# Patient Record
Sex: MALE | Race: WHITE
[De-identification: names, ages, dates, MRNs, and addresses within clinical notes are randomized per-mention and may not be internally consistent; named-entity substitution may affect disease eponyms.]

---

## 2018-03-30 ENCOUNTER — HOSPITAL ENCOUNTER (INPATIENT)
Dept: HOSPITAL 77 - KA.MS | Age: 83
LOS: 2 days | Discharge: HOME | DRG: 194 | End: 2018-04-01
Attending: NURSE PRACTITIONER | Admitting: NURSE PRACTITIONER
Payer: MEDICARE

## 2018-03-30 DIAGNOSIS — G30.9: ICD-10-CM

## 2018-03-30 DIAGNOSIS — B95.5: ICD-10-CM

## 2018-03-30 DIAGNOSIS — K44.9: ICD-10-CM

## 2018-03-30 DIAGNOSIS — M10.9: ICD-10-CM

## 2018-03-30 DIAGNOSIS — J18.9: Primary | ICD-10-CM

## 2018-03-30 DIAGNOSIS — N18.9: ICD-10-CM

## 2018-03-30 DIAGNOSIS — I50.9: ICD-10-CM

## 2018-03-30 DIAGNOSIS — F02.80: ICD-10-CM

## 2018-03-30 DIAGNOSIS — I13.0: ICD-10-CM

## 2018-03-30 DIAGNOSIS — G25.81: ICD-10-CM

## 2018-03-30 DIAGNOSIS — I48.2: ICD-10-CM

## 2018-03-30 PROCEDURE — C9113 INJ PANTOPRAZOLE SODIUM, VIA: HCPCS

## 2018-03-30 RX ADMIN — FLUTICASONE PROPIONATE SCH SPRAY: 50 SPRAY, METERED NASAL at 21:04

## 2018-03-31 LAB
CHLORIDE SERPL-SCNC: 107 MMOL/L (ref 98–115)
SODIUM SERPL-SCNC: 144 MMOL/L (ref 136–145)

## 2018-03-31 RX ADMIN — FLUTICASONE PROPIONATE SCH SPRAY: 50 SPRAY, METERED NASAL at 08:57

## 2018-03-31 RX ADMIN — Medication PRN ML: at 21:04

## 2018-03-31 RX ADMIN — FLUTICASONE PROPIONATE SCH SPRAY: 50 SPRAY, METERED NASAL at 21:04

## 2018-03-31 NOTE — PN
03/31/2018 PATIENT NAME:  SREEDHAR MCDOWELL

 

CHIEF COMPLAINT:  Does feel overall better.  White count is decreasing.  No

sputum production.

 

BRIEF HISTORY:  This patient was admitted by Randa Rondon, Nurse Practitioner,

yesterday due to bilateral lower lobe pneumonia.  The patient failed outpatient

treatment.  He was seen and evaluated yesterday.  Complains of "feeling

miserable".  He had woke up early in the morning around 2:30 with body aches,

chills, headaches, and shortness of breath.  The patient does report pneumonia

approximately four or five years ago.  The patient also reports history of

pleural effusions with status post thoracentesis at one time.  He was admitted

for IV antibiotics for pneumonia.

 

PHYSICAL EXAM:  VITAL SIGNS:  Blood pressure 134/87, heart rate 80, irregular,

temperature 97.8, respiratory rate 18, O2 sats 95%, this is room air.  The

patient's weight is 194.5 pounds.

GENERAL:  The patient is alert and oriented. LUNGS:  He does have some mild

rhonchi in lower bases, however diaphragmatic excursion normal.

CV:  Irregular rhythm, however, rate controlled at 80.  No S3.  PMI

midclavicular line, 2nd or 3rd intercostal space.

GI/:  Nontender.  Good bowel tones.

EXTREMITIES:  Lower extremity slightly erythematous; 2 to 3+ edema of the right

great toe.  Good distal pulses.

 

LABS:  Yesterday, white count 25,000; this morning, 11.6, neutrophilia 80%.  INR

2.4.  Sodium and potassium normal.  Creatinine 1.48, BUN 22, calcium 8.9,

hemoglobin 16.2, hematocrit 49.1.  BNP in [facility] 170, slightly elevated.

 

IMPRESSION/PLAN:

1. Bilateral lower lobe pneumonia, likely streptococcal.  Discontinue

    levofloxacin.  Add azithromycin.  Continue with ceftriaxone.  We will see

    if we can get sputum culture, assess pneumococcal status.  Incentive

    spirometer.  Good pulmonary toileting.  DuoNebs.

2. Inflammatory gout.  We will add tapered dose of prednisone likely to

    benefit for pneumonia, however, was given loop diuretic yesterday.

3. Atrial fibrillation, chronic.  CHADVAS2 score high on anticoagulation.  INR

    therapeutic.  Rate control with beta-blocker.

4. Heart failure, minimally clinical presentation at this time.  BNP slightly

    elevated at 170, although does have gouty arthropathy, likely could benefit

    from 20 mg of Lasix today.  Chest x-ray, independently read, does have some

    vascular congestion, slightly fluid overload picture.  Daily weights.  ARB

    ongoing.  Mean arterial pressure adequate.  The patient is warm, pink, and

    perfusing.  Adequate urinary output.

5. Chronic kidney disease.  BUN 22, creatinine 1.48, adequate output, likely

    not a candidate for NSAIDs for gout.  We will give low-dose tapering of

    prednisone.  Eventually, likely will benefit from renal dose allopurinol at

    some point after acute flare up.

6. GI stress prophylaxis.  PPI therapy (GERD history).

Other chronic medical conditions include

1. Hypertension.

2. Hiatal hernia.

3. RLS.

All stable.

 

DISPOSITION/OVERALL PLAN:  The patient likely could benefit from another 24-to-

48 hours inpatient stay.  Discontinue levofloxacin.  Add azithromycin.  Assess

labs in the a.m.  Incentive spirometer, pulmonary toileting, prednisone for

gout, PPI therapy.

 

DD:  03/31/2018 11:04:41

DT:  03/31/2018 13:04:33

Job #:  [id number]

## 2018-04-01 VITALS — SYSTOLIC BLOOD PRESSURE: 116 MMHG | DIASTOLIC BLOOD PRESSURE: 85 MMHG

## 2018-04-01 LAB
CHLORIDE SERPL-SCNC: 106 MMOL/L (ref 98–115)
SODIUM SERPL-SCNC: 144 MMOL/L (ref 136–145)

## 2018-04-01 RX ADMIN — Medication PRN ML: at 13:24

## 2018-04-01 RX ADMIN — Medication PRN ML: at 10:56

## 2018-04-01 RX ADMIN — FLUTICASONE PROPIONATE SCH SPRAY: 50 SPRAY, METERED NASAL at 08:07

## 2018-04-01 RX ADMIN — Medication PRN ML: at 08:09

## 2018-04-03 NOTE — DISCH
FINAL DIAGNOSES:

1. Bilateral lower lobe pneumonia, clinically improving.

2. Inflammatory gout, improving.

3. Atrial fibrillation, chronic.  CHADS2-VASc score high, on anticoagulation.

    INR therapeutic, rate controlled with beta blocker.

4. Heart failure with minimal clinical presentation.

5. Chronic kidney disease.

6. Rule out Alzheimer's.

 

HISTORY:  This patient was initially admitted to the [facility].  He was

seen previous day in the [facility].  The patient failed outpatient

treatment, was treated for pneumonia with azithromycin.  The patient was

complaining of feeling quite miserable.  He had woke up early morning with some

body aches, headache, shortness of breath.  He does report a history of

pneumonia 4 or 5 years ago, at that time status post thoracentesis.  He was

admitted mainly for outpatient treatment failure for IV antibiotics and ongoing

monitoring.

 

HOSPITAL COURSE:  Hospital course went well.  He did have a significant white

count prior to admission at 25,000 with high neutrophilia, however, with ongoing

antibiotics, it did respond to 11.6, neutrophilia 80%.  Upon admission, he was

given Levaquin and ceftriaxone.  I discontinued Levaquin and added

azithromycin.  The patient responded well.  He did have inflammatory gout in his

right great toe.  Had an elevated uric acid level on admission.  NSAIDs were

held.  He was treated over a tapering dose of steroids.  He responded favorably.

He did remain in atrial fibrillation without any hemodynamic compromise.  His

white count did elevate slightly with prednisone  and with discharge at 14.3

thousand, neutrophilia was 90%.  However, clinically he responded well to

antibiotics.  INR on discharge was 2.1.  Sodium and potassium normal.  BUN 25

and creatinine 1.34, decreasing.

 

MICROBIOLOGY REPORT:  Pending blood cultures on discharge.

 

PHYSICAL EXAMINATION:

VITAL SIGNS:  On discharge, blood pressure 116/85, O2 saturation 95% on room

air, respiratory rate 18.

GENERAL:  On discharge, the patient was alert and oriented, however, some memory

deficit apparent, however slightly.

LUNGS:  Clear to auscultation.

CV:  Irregular rate.  History of atrial fibrillation.  He was using his

incentive spirometer up to 2300 mL.  Good pulmonary excursion.  No sputum

collection was able to be obtained.

 

MEDICATION ADJUSTMENTS ON DISCHARGE:  Short course of tapered prednisone for

gout.  He can continue with azithromycin on discharge.  Discontinue Levaquin and

ceftriaxone.  He can continue on all his other home meds.

 

He is to continue with coughing and deep breathing exercises.  Report fevers or

any shortness of breath or mucus production.  He will follow up with Randa Rondon at [facility] outpatient.

 

RECOMMENDATIONS AT FOLLOWUP:  Consider neuropsychological testing for possible

Alzheimer's dementia.

 

DD:  04/03/2018 10:51:27

DT:  04/03/2018 13:10:06

Job #:  [id number]

MTDD

## 2018-04-11 ENCOUNTER — HOSPITAL ENCOUNTER (INPATIENT)
Dept: HOSPITAL 77 - KA.ED | Age: 83
LOS: 4 days | Discharge: HOME | DRG: 871 | End: 2018-04-15
Attending: FAMILY MEDICINE | Admitting: PHYSICIAN ASSISTANT
Payer: MEDICARE

## 2018-04-11 DIAGNOSIS — E46: ICD-10-CM

## 2018-04-11 DIAGNOSIS — R79.1: ICD-10-CM

## 2018-04-11 DIAGNOSIS — Z79.01: ICD-10-CM

## 2018-04-11 DIAGNOSIS — I48.91: ICD-10-CM

## 2018-04-11 DIAGNOSIS — E03.9: ICD-10-CM

## 2018-04-11 DIAGNOSIS — A41.9: Primary | ICD-10-CM

## 2018-04-11 DIAGNOSIS — I50.9: ICD-10-CM

## 2018-04-11 DIAGNOSIS — E53.8: ICD-10-CM

## 2018-04-11 DIAGNOSIS — Z79.899: ICD-10-CM

## 2018-04-11 DIAGNOSIS — R74.0: ICD-10-CM

## 2018-04-11 DIAGNOSIS — J96.01: ICD-10-CM

## 2018-04-11 DIAGNOSIS — Z88.8: ICD-10-CM

## 2018-04-11 DIAGNOSIS — N18.3: ICD-10-CM

## 2018-04-11 DIAGNOSIS — J18.1: ICD-10-CM

## 2018-04-11 DIAGNOSIS — I10: ICD-10-CM

## 2018-04-11 DIAGNOSIS — G47.00: ICD-10-CM

## 2018-04-11 DIAGNOSIS — E78.5: ICD-10-CM

## 2018-04-11 DIAGNOSIS — I95.9: ICD-10-CM

## 2018-04-11 DIAGNOSIS — E87.0: ICD-10-CM

## 2018-04-11 DIAGNOSIS — M10.9: ICD-10-CM

## 2018-04-11 DIAGNOSIS — E87.2: ICD-10-CM

## 2018-04-11 LAB
CHLORIDE SERPL-SCNC: 108 MMOL/L (ref 98–115)
SODIUM SERPL-SCNC: 145 MMOL/L (ref 136–145)

## 2018-04-11 PROCEDURE — 83880 ASSAY OF NATRIURETIC PEPTIDE: CPT

## 2018-04-11 PROCEDURE — 80053 COMPREHEN METABOLIC PANEL: CPT

## 2018-04-11 PROCEDURE — 84443 ASSAY THYROID STIM HORMONE: CPT

## 2018-04-11 PROCEDURE — 84484 ASSAY OF TROPONIN QUANT: CPT

## 2018-04-11 PROCEDURE — 87040 BLOOD CULTURE FOR BACTERIA: CPT

## 2018-04-11 PROCEDURE — 99285 EMERGENCY DEPT VISIT HI MDM: CPT

## 2018-04-11 PROCEDURE — 85610 PROTHROMBIN TIME: CPT

## 2018-04-11 PROCEDURE — 36415 COLL VENOUS BLD VENIPUNCTURE: CPT

## 2018-04-11 PROCEDURE — 85730 THROMBOPLASTIN TIME PARTIAL: CPT

## 2018-04-11 PROCEDURE — 84439 ASSAY OF FREE THYROXINE: CPT

## 2018-04-11 PROCEDURE — 81001 URINALYSIS AUTO W/SCOPE: CPT

## 2018-04-11 PROCEDURE — 86140 C-REACTIVE PROTEIN: CPT

## 2018-04-11 PROCEDURE — 96374 THER/PROPH/DIAG INJ IV PUSH: CPT

## 2018-04-11 PROCEDURE — 83735 ASSAY OF MAGNESIUM: CPT

## 2018-04-11 PROCEDURE — 85025 COMPLETE CBC W/AUTO DIFF WBC: CPT

## 2018-04-11 RX ADMIN — TAZOBACTAM SODIUM AND PIPERACILLIN SODIUM SCH MLS/HR: 375; 3 INJECTION, SOLUTION INTRAVENOUS at 19:50

## 2018-04-11 NOTE — EDM.PDOC
ED HPI GENERAL MEDICAL PROBLEM





- General


Chief Complaint: Cardiovascular Problem


Stated Complaint: SHORTNESS OF BREATH


Time Seen by Provider: 04/11/18 11:18


Source of Information: Reports: Patient


History Limitations: Reports: No Limitations





- History of Present Illness


INITIAL COMMENTS - FREE TEXT/NARRATIVE: 





Patient is an 82-year-old gentleman who presents to the emergency department 

via EMS secondary to atrial fibrillation and shortness of breath.  Patient was 

seen at [facility] in [place] for follow-up.  Status post pneumonia 

admission and was found to continue being short of breath.  Discussed case with 

Mylene from [facility], and she initiated transfer to ER.  Patient states 

that he was feeling well 2 days ago, but yesterday developed some shortness of 

breath and feeling of increased heart rate.  Symptoms worsened about 4 o'clock 

this morning, so he presented to [facility] today.  Patient denies chest 

pain, fever, nausea, vomiting, abdominal pain, peripheral edema, headache, 

vision changes, or any change in medication.


Onset: Gradual


Duration: Day(s):


Improves with: Reports: None


Worsens with: Reports: None


Associated Symptoms: Reports: Shortness of Breath





- Related Data


 Allergies











Allergy/AdvReac Type Severity Reaction Status Date / Time


 


ACE Inhibitors Allergy  THROAT Verified 04/11/18 11:32





   TICKLES  


 


lisinopril Allergy  UNKNOWN Verified 04/11/18 11:32











Home Meds: 


 Home Meds





Acetaminophen [Acetaminophen Extra Strength] 1,000 mg PO ASDIRECTED PRN 11/26/ 14 [History]


Albuterol Sulfate [Albuterol Sulfate HFA] 1 puff INH Q4H PRN 11/26/14 [History]


ClonazePAM [KlonoPIN] 0.5 mg PO BEDTIME 11/26/14 [History]


Levothyroxine [Synthroid] 50 mcg PO BEDTIME 11/26/14 [History]


Losartan [Cozaar] 25 mg PO DAILY 11/26/14 [History]


Warfarin [Coumadin] 5 mg PO SUTUWETHSA@2100 11/26/14 [History]


Cholecalciferol (Vitamin D3) [Vitamin D3] 1,000 unit PO Q48H 03/30/18 [History]


Ipratropium [Atrovent 0.03% Nasal Spray] 2 spray NASBOTH TID 03/30/18 [History]


Mecobalamin [B-12] 1,000 mcg SL Q48H 03/30/18 [History]


Metoprolol Succinate 50 mg PO DAILY 03/30/18 [History]


Warfarin [Coumadin] 2.5 mg PO MOFR@2100 03/30/18 [History]











Past Medical History


HEENT History: Reports: Cataract


Cardiovascular History: Reports: Heart Failure, Hypertension


Respiratory History: Reports: Pneumonia, Recurrent, Sleep Apnea, SOB


Gastrointestinal History: Reports: Chronic Diarrhea


Other Gastrointestinal History: gluten free d/t IBS


Musculoskeletal History: Reports: Arthritis


Neurological History: Reports: Headaches, Chronic, Head Trauma


Other Neuro History: blood clots in the brain,


Endocrine/Metabolic History: Reports: Hypothyroidism


Hematologic History: Reports: B12 Deficiency


Other Dermatologic History: shingles in sept.





- Infectious Disease History


Infectious Disease History: Reports: Chicken Pox, Influenza, Mumps, Shingles





- Past Surgical History


HEENT Surgical History: Reports: Cataract Surgery


Cardiovascular Surgical History: Reports: None


Respiratory Surgical History: Reports: Thoracentesis


Endocrine Surgical History: Reports: Thyroidectomy


Musculoskeletal Surgical History: Reports: Shoulder Surgery





Social & Family History





- Tobacco Use


Smoking Status *Q: Never Smoker


Second Hand Smoke Exposure: No





- Caffeine Use


Caffeine Use: Reports: Soda





- Recreational Drug Use


Recreational Drug Use: No





- Living Situation & Occupation


Living situation: Reports: 


Occupation: Retired





ED ROS GENERAL





- Review of Systems


Review Of Systems: ROS reveals no pertinent complaints other than HPI.


Constitutional: Reports: No Symptoms


HEENT: Reports: No Symptoms


Respiratory: Reports: Shortness of Breath


Cardiovascular: Reports: Palpitations


Endocrine: Reports: No Symptoms


GI/Abdominal: Reports: No Symptoms


: Reports: No Symptoms


Musculoskeletal: Reports: No Symptoms


Skin: Reports: No Symptoms


Neurological: Reports: No Symptoms


Psychiatric: Reports: No Symptoms


Hematologic/Lymphatic: Reports: No Symptoms


Immunologic: Reports: No Symptoms





ED EXAM, GENERAL





- Physical Exam


Exam: See Below


Exam Limited By: No Limitations


General Appearance: Alert, WD/WN, No Apparent Distress


Eye Exam: Bilateral Eye: Normal Inspection


Nose: Normal Inspection, Normal Mucosa, No Blood


Throat/Mouth: Normal Inspection, Normal Oropharynx, No Airway Compromise


Head: Atraumatic, Normocephalic


Neck: Normal Inspection, Supple, Non-Tender


Respiratory/Chest: No Respiratory Distress, Rales (Bibasilar)


Cardiovascular: No Murmur, Irregularly Irregular


GI/Abdominal: Normal Bowel Sounds, Soft, Non-Tender


Back Exam: Normal Inspection.  No: CVA Tenderness (L), CVA Tenderness (R)


Extremities: Normal Inspection, Non-Tender, No Pedal Edema


Neurological: Alert, Oriented


Psychiatric: Normal Affect, Normal Mood


Skin Exam: Warm, Dry, Intact, Normal Color, No Rash


Lymphatic: No Adenopathy





EKG INTERPRETATION


EKG Date: 04/11/18


Time: 11:35


Rhythm: A-Fib


Rate (Beats/Min): 120


Comparison: No Change





Course





- Vital Signs


Last Recorded V/S: 


 Last Vital Signs











Temp  99 F   04/11/18 11:28


 


Pulse  133 H  04/11/18 11:28


 


Resp  25 H  04/11/18 11:28


 


BP  142/102 H  04/11/18 11:28


 


Pulse Ox  97   04/11/18 11:46














- Orders/Labs/Meds


Orders: 


 Active Orders 24 hr











 Category Date Time Status


 


 EKG Documentation Completion [RC] ASDIRECTED Care  04/11/18 11:20 Active


 


 Peripheral IV Care [RC] .AS DIRECTED Care  04/11/18 11:20 Active


 


 CULTURE BLOOD [BC] Stat Lab  04/11/18 11:45 Received


 


 CULTURE BLOOD [BC] Stat Lab  04/11/18 12:05 Received


 


 Sodium Chloride 0.9% [Syrex Flush] Med  04/11/18 11:18 Active





 5 ml FLUSH Q8HR PRN   


 


 Blood Culture x2 Reflex Set [OM.PC] Stat Oth  04/11/18 11:44 Ordered


 


 Peripheral IV Insertion Adult [OM.PC] Stat Oth  04/11/18 11:18 Ordered


 


 EKG 12 Lead [EK] Stat Ther  04/11/18 11:18 Ordered








 Medication Orders





Sodium Chloride (Syrex Flush)  5 ml FLUSH Q8HR PRN


   PRN Reason: Keep Vein Open








Labs: 


 Laboratory Tests











  04/11/18 04/11/18 04/11/18 Range/Units





  11:25 11:25 11:25 


 


WBC  30.1 H* D    (5.0-10.0)  10^3/uL


 


RBC  5.61    (4.50-6.00)  10^6/uL


 


Hgb  17.0    (13.0-17.0)  g/dL


 


Hct  51.3    (40.0-52.0)  %


 


MCV  91.5    (82.0-92.0)  fL


 


MCH  30.4    (27.0-31.0)  pg


 


MCHC  33.2    (32.0-36.0)  g/dL


 


RDW  13.6    (11.5-14.5)  %


 


Plt Count  178    (150-300)  10^3/uL


 


MPV  9.0    (7.4-10.4)  fL


 


Add Manual Diff  Yes    


 


Neutrophils % (Manual)  97 H    (50-70)  %


 


Lymphocytes % (Manual)  2 L    (20-40)  %


 


Monocytes % (Manual)  1 L    (2-8)  %


 


Absolute Neutrophils  29.1970    


 


Lymphocytes # (Manual)  0.6020    


 


Monocytes # (Manual)  0.3010    


 


Platelet Estimate  Adequate    


 


Clumped Platelets  Few    


 


RBC Morph Comment  See note    


 


PT    47.3 H D  (8.9-11.4)  SEC


 


INR    4.9 H*  (0.9-1.1)  


 


APTT    39.3 H  (20.8-31.2)  SEC


 


Sodium   145   (136-145)  mmol/L


 


Potassium   4.6   (3.3-5.3)  mmol/L


 


Chloride   108   ()  mmol/L


 


Carbon Dioxide   27.0   (21.0-32.0)  mmol/L


 


BUN   17   (6-25)  mg/dL


 


Creatinine   1.23 H   (0.51-1.17)  mg/dL


 


Est Cr Clr Drug Dosing   49.46   mL/min


 


Estimated GFR (MDRD)   57   mL/min


 


Glucose   99   ()  mg/dL


 


Calcium   8.8   (8.7-10.3)  mg/dL


 


Magnesium   1.9   (1.8-2.4)  mg/dL


 


Total Bilirubin   1.2 H   (0.2-1.0)  mg/dL


 


AST   36   (15-37)  U/L


 


ALT   62   (12-78)  U/L


 


Alkaline Phosphatase   80   ()  IU/L


 


Troponin I   < 0.04   (0.00-0.070)  ng/mL


 


B-Natriuretic Peptide   54   (0-100)  pg/mL


 


Total Protein   6.8   (6.4-8.2)  g/dL


 


Albumin   3.47   (3.00-4.80)  g/dL


 


Free T4   0.95   (0.59-1.17)  ng/dL


 


TSH, Ultra Sensitive   1.120   (0.340-4.820)  uIU/mL











Meds: 


Medications











Generic Name Dose Route Start Last Admin





  Trade Name Freq  PRN Reason Stop Dose Admin


 


Sodium Chloride  5 ml  04/11/18 11:18  





  Syrex Flush  FLUSH   





  Q8HR PRN   





  Keep Vein Open   





     





     





     














Discontinued Medications














Generic Name Dose Route Start Last Admin





  Trade Name Haley  PRN Reason Stop Dose Admin


 


Ceftriaxone Sodium  Confirm  04/11/18 13:10  





  Rocephin  Administered  04/11/18 13:11  





  Dose   





  1 gm   





  .ROUTE   





  .STK-MED ONE   





     





     





     





     


 


Ceftriaxone Sodium  1 gm  04/11/18 13:14  





  Rocephin  IVPUSH  04/11/18 13:15  





  ONETIME ONE   





     





     





     





     














- Radiology Interpretation


Free Text/Narrative:: 





Chest x-ray results were performed at the [facility] showed consolidation 

at the right base





- Re-Assessments/Exams


Free Text/Narrative Re-Assessment/Exam: 





04/11/18 13:48


Patient afebrile, nontoxic appearing, vital signs stable, patient remains in 

atrial fibrillation with heart rate between 100 and 120.  Oxygen saturation at 

95% on nasal cannula.  Discussed case with Christian Lundy from [facility], 

and patient will be admitted inpatient and followed by him.





Departure





- Departure


Time of Disposition: 13:56


Disposition: Admitted As Inpatient 68


Condition: Fair


Clinical Impression: 


 Atrial fibrillation





Pneumonia


Qualifiers:


 Pneumonia type: due to unspecified organism Laterality: right Lung location: 

lower lobe of lung Qualified Code(s): J18.1 - Lobar pneumonia, unspecified 

organism





Referrals: 


Mylene Balbuena NP [Primary Care Provider] - 


Forms:  ED Department Discharge





- My Orders


Last 24 Hours: 


My Active Orders





04/11/18 11:18


Sodium Chloride 0.9% [Syrex Flush]   5 ml FLUSH Q8HR PRN 


Peripheral IV Insertion Adult [OM.PC] Stat 


EKG 12 Lead [EK] Stat 





04/11/18 11:20


EKG Documentation Completion [RC] ASDIRECTED 


Peripheral IV Care [RC] .AS DIRECTED 





04/11/18 11:44


Blood Culture x2 Reflex Set [OM.PC] Stat 





04/11/18 11:45


CULTURE BLOOD [BC] Stat 





04/11/18 12:05


CULTURE BLOOD [BC] Stat 














- Assessment/Plan


Last 24 Hours: 


My Active Orders





04/11/18 11:18


Sodium Chloride 0.9% [Syrex Flush]   5 ml FLUSH Q8HR PRN 


Peripheral IV Insertion Adult [OM.PC] Stat 


EKG 12 Lead [EK] Stat 





04/11/18 11:20


EKG Documentation Completion [RC] ASDIRECTED 


Peripheral IV Care [RC] .AS DIRECTED 





04/11/18 11:44


Blood Culture x2 Reflex Set [OM.PC] Stat 





04/11/18 11:45


CULTURE BLOOD [BC] Stat 





04/11/18 12:05


CULTURE BLOOD [BC] Stat

## 2018-04-12 LAB
CHLORIDE SERPL-SCNC: 109 MMOL/L (ref 98–115)
SODIUM SERPL-SCNC: 149 MMOL/L (ref 136–145)

## 2018-04-12 RX ADMIN — TAZOBACTAM SODIUM AND PIPERACILLIN SODIUM SCH MLS/HR: 375; 3 INJECTION, SOLUTION INTRAVENOUS at 19:28

## 2018-04-12 RX ADMIN — TAZOBACTAM SODIUM AND PIPERACILLIN SODIUM SCH MLS/HR: 375; 3 INJECTION, SOLUTION INTRAVENOUS at 06:18

## 2018-04-12 RX ADMIN — TAZOBACTAM SODIUM AND PIPERACILLIN SODIUM SCH MLS/HR: 375; 3 INJECTION, SOLUTION INTRAVENOUS at 00:58

## 2018-04-12 RX ADMIN — TAZOBACTAM SODIUM AND PIPERACILLIN SODIUM SCH MLS/HR: 375; 3 INJECTION, SOLUTION INTRAVENOUS at 12:25

## 2018-04-12 NOTE — PCM.HP
H&P History of Present Illness





- General


Date of Service: 04/12/18


Admit Problem/Dx: 


 Admission Diagnosis/Problem





Admission Diagnosis/Problem      Pneumonia











- History of Present Illness


Initial Comments - Free Text/Narative: 





This 82-year-old gentleman yesterday was admitted through the ED due to atrial 

fibrillation and shortness of breath. He was initially evaluated at an [facility] however due to limited availability for diagnostics patient was 

sent to ED for further evaluation and workup.  Patient states that he was 

feeling well 2 days ago, but then started developing up shortness of breath and 

feeling of increased heart rate.  Symptoms worsened about 4 o'clock morning of 

admission--prompting outpatient appointment. On admission in the ED he denied 

any chest pain, fever, nausea, vomiting, abdominal pain, peripheral edema, 

headache, vision changes, or any change in medication. Proximally 2 weeks ago 

patient was discharged from hospital with bilateral lower lobe pneumonia and a 

subsequent follow-up from hospitalization he was doing very well without any 

symptoms. 





- Related Data


Allergies/Adverse Reactions: 


 Allergies











Allergy/AdvReac Type Severity Reaction Status Date / Time


 


ACE Inhibitors Allergy  THROAT Verified 04/11/18 11:32





   TICKLES  


 


lisinopril Allergy  UNKNOWN Verified 04/11/18 11:32











Home Medications: 


 Home Meds





Acetaminophen [Acetaminophen Extra Strength] 1,000 mg PO ASDIRECTED PRN 11/26/ 14 [History]


Albuterol Sulfate [Albuterol Sulfate HFA] 1 puff INH Q4H PRN 11/26/14 [History]


ClonazePAM [KlonoPIN] 0.5 mg PO BEDTIME 11/26/14 [History]


Levothyroxine [Synthroid] 50 mcg PO BEDTIME 11/26/14 [History]


Losartan [Cozaar] 25 mg PO DAILY 11/26/14 [History]


Warfarin [Coumadin] 5 mg PO SUTUWETHSA@2100 11/26/14 [History]


Cholecalciferol (Vitamin D3) [Vitamin D3] 1,000 unit PO Q48H 03/30/18 [History]


Ipratropium [Atrovent 0.03% Nasal Spray] 2 spray NASBOTH TID 03/30/18 [History]


Mecobalamin [B-12] 1,000 mcg SL Q48H 03/30/18 [History]


Metoprolol Succinate 50 mg PO DAILY 03/30/18 [History]


Warfarin [Coumadin] 2.5 mg PO MOFR@2100 03/30/18 [History]


Amoxicillin/Clavulanate K [Augmentin 875-125 MG] 1 tab PO BID 5 Days #10 tablet 

04/15/18 [Rx]


Prednisone [IJD: predniSONE] 20 mg PO DAILY 2 Days #2 tablet 04/15/18 [Rx]


guaiFENesin [Mucinex] 600 mg PO BID #10 tab.er 04/15/18 [Rx]











Past Medical History


HEENT History: Reports: Cataract


Cardiovascular History: Reports: Afib, Heart Failure, Hypertension


Respiratory History: Reports: Pneumonia, Recurrent, Sleep Apnea, SOB


Gastrointestinal History: Reports: Chronic Diarrhea


Other Gastrointestinal History: gluten free d/t IBS


Genitourinary History: Reports: Renal Calculus


Musculoskeletal History: Reports: Arthritis, Gout


Neurological History: Reports: Headaches, Chronic, Head Trauma


Other Neuro History: blood clots in the brain,


Endocrine/Metabolic History: Reports: Hypothyroidism


Hematologic History: Reports: B12 Deficiency


Other Dermatologic History: shingles in sept.





- Infectious Disease History


Infectious Disease History: Reports: Chicken Pox, Influenza, Mumps, Shingles





- Past Surgical History


HEENT Surgical History: Reports: Cataract Surgery


Cardiovascular Surgical History: Reports: None


Respiratory Surgical History: Reports: Thoracentesis


GI Surgical History: Reports: Cholecystectomy, Colonoscopy


Male  Surgical History: Reports: Circumcision, Renal Calculus


Endocrine Surgical History: Reports: Thyroidectomy


Musculoskeletal Surgical History: Reports: Shoulder Surgery





Social & Family History





- Family History


HEENT: Reports: None


Cardiac: Reports: None


Respiratory: Reports: None


GI: Reports: None


: Reports: Diabetic Nephropathy


Musculoskeletal: Reports: Back pain, Chronic


Neurological: Reports: None


Psychiatric: Reports: None


Endocrine/Metabolic: Reports: Diabetes, type II


Hematologic: Reports: None


Immunologic: Reports: None


Dermatologic: Reports: None


Oncologic: Reports: None





- Tobacco Use


Smoking Status *Q: Never Smoker


Second Hand Smoke Exposure: No





- Caffeine Use


Caffeine Use: Reports: Soda





- Recreational Drug Use


Recreational Drug Use: No





- Living Situation & Occupation


Living situation: Reports: 


Occupation: Retired





H&P Review of Systems





- Review of Systems:


Review Of Systems: See Below


General: Reports: No Symptoms


HEENT: Reports: No Symptoms


Pulmonary: Denies: Shortness of Breath, Wheezing, Pleuritic Chest Pain, Cough, 

Sputum


Cardiovascular: Reports: Blood Pressure Problem.  Denies: Chest Pain, 

Palpitations, Orthopnea, Edema


Gastrointestinal: Reports: No Symptoms


Genitourinary: Reports: No Symptoms


Musculoskeletal: Reports: No Symptoms


Skin: Reports: No Symptoms


Psychiatric: Denies: Confusion


Neurological: Denies: Confusion, Dizziness


Hematologic/Lymphatic: Reports: No Symptoms


Immunologic: Reports: No Symptoms





Exam





- Exam


Exam: See Below





- Vital Signs


Vital Signs: 


 Last Vital Signs











Temp  97 F   04/12/18 06:36


 


Pulse  92   04/12/18 09:29


 


Resp  20   04/12/18 08:01


 


BP  127/82   04/12/18 09:29


 


Pulse Ox  97   04/12/18 08:01











Weight: 200 lb 1.6 oz





- Exam


Quality Assessment: Supplemental Oxygen, DVT Prophylaxis (On Coumadin for A. fib

)


General: Alert, Oriented, Cooperative.  No: Mild Distress


HEENT: Conjunctiva Clear, Mucosa Moist & Pink


Neck: Supple.  No: JVD


Lungs: No: Decreased Breath Sounds, Crackles, Rhonchi, Wheezing


Cardiovascular: Irregular Rhythm.  No: Tachycardia


GI/Abdominal Exam: Soft


 (Male) Exam: Deferred


Rectal (Males) Exam: Deferred


Back Exam: No: CVA Tenderness (L)


Extremities: No Pedal Edema, Normal Capillary Refill.  No: Pedal Edema


Neurological: Cranial Nerves Intact


Neuro Extensive - Mental Status: Alert, Oriented x3, Memory Intact


Neuro Extensive - Motor, Sensory, Reflexes: CN II-XII Intact


Psychiatric: Alert, Normal Affect, Normal Mood





- Patient Data


Lab Results Last 24 hrs: 


 Laboratory Results - last 24 hr











  04/11/18 04/11/18 04/11/18 Range/Units





  11:25 11:25 11:25 


 


WBC  30.1 H* D    (5.0-10.0)  10^3/uL


 


RBC  5.61    (4.50-6.00)  10^6/uL


 


Hgb  17.0    (13.0-17.0)  g/dL


 


Hct  51.3    (40.0-52.0)  %


 


MCV  91.5    (82.0-92.0)  fL


 


MCH  30.4    (27.0-31.0)  pg


 


MCHC  33.2    (32.0-36.0)  g/dL


 


RDW  13.6    (11.5-14.5)  %


 


Plt Count  178    (150-300)  10^3/uL


 


MPV  9.0    (7.4-10.4)  fL


 


Neut % (Auto)     (50.0-70.0)  %


 


Lymph % (Auto)     (20.0-40.0)  %


 


Mono % (Auto)     (2.0-8.0)  %


 


Eos % (Auto)     (1.0-3.0)  %


 


Baso % (Auto)     (0.0-1.0)  %


 


Neut # (Auto)     (2.5-7.0)  10^3/uL


 


Lymph # (Auto)     (1.0-4.0)  10^3/uL


 


Mono # (Auto)     (0.1-0.8)  10^3/uL


 


Eos # (Auto)     (0.1-0.3)  10^3/uL


 


Baso # (Auto)     (0.0-0.1)  10^3/uL


 


Add Manual Diff  Yes    


 


Neutrophils % (Manual)  97 H    (50-70)  %


 


Lymphocytes % (Manual)  2 L    (20-40)  %


 


Monocytes % (Manual)  1 L    (2-8)  %


 


Absolute Neutrophils  29.1970    


 


Lymphocytes # (Manual)  0.6020    


 


Monocytes # (Manual)  0.3010    


 


Platelet Estimate  Adequate    


 


Clumped Platelets  Few    


 


RBC Morph Comment  See note    


 


PT    47.3 H D  (8.9-11.4)  SEC


 


INR    4.9 H*  (0.9-1.1)  


 


APTT    39.3 H  (20.8-31.2)  SEC


 


Sodium   145   (136-145)  mmol/L


 


Potassium   4.6   (3.3-5.3)  mmol/L


 


Chloride   108   ()  mmol/L


 


Carbon Dioxide   27.0   (21.0-32.0)  mmol/L


 


BUN   17   (6-25)  mg/dL


 


Creatinine   1.23 H   (0.51-1.17)  mg/dL


 


Est Cr Clr Drug Dosing   49.46   mL/min


 


Estimated GFR (MDRD)   57   mL/min


 


Glucose   99   ()  mg/dL


 


Lactic Acid     (0.4-2.0)  mmol/L


 


Calcium   8.8   (8.7-10.3)  mg/dL


 


Magnesium   1.9   (1.8-2.4)  mg/dL


 


Total Bilirubin   1.2 H   (0.2-1.0)  mg/dL


 


AST   36   (15-37)  U/L


 


ALT   62   (12-78)  U/L


 


Alkaline Phosphatase   80   ()  IU/L


 


Troponin I   < 0.04   (0.00-0.070)  ng/mL


 


C-Reactive Protein     (0.0-0.9)  mg/dL


 


B-Natriuretic Peptide   54   (0-100)  pg/mL


 


Total Protein   6.8   (6.4-8.2)  g/dL


 


Albumin   3.47   (3.00-4.80)  g/dL


 


Free T4   0.95   (0.59-1.17)  ng/dL


 


TSH, Ultra Sensitive   1.120   (0.340-4.820)  uIU/mL


 


Specimen Type     


 


Urine Color     (YELLOW)  


 


Urine Appearance     (CLEAR)  


 


Urine pH     (5.0-9.0)  


 


Ur Specific Gravity     (1.005-1.030)  


 


Urine Protein     (NEGATIVE)  mg/dL


 


Urine Glucose (UA)     (NEGATIVE)  mg/dL


 


Urine Ketones     (NEGATIVE)  mg/dL


 


Urine Occult Blood     (NEGATIVE)  


 


Urine Nitrite     (NEGATIVE)  


 


Urine Bilirubin     (NEGATIVE)  


 


Urine Urobilinogen     (0.2-1.0)  E.U./dL


 


Ur Leukocyte Esterase     (NEGATIVE)  


 


Urine RBC     /HPF


 


Urine WBC     /HPF


 


Ur Epithelial Cells     /LPF


 


Urine Bacteria     (NONE TO FEW)  /HPF














  04/11/18 04/11/18 04/11/18 Range/Units





  11:30 13:30 15:20 


 


WBC     (5.0-10.0)  10^3/uL


 


RBC     (4.50-6.00)  10^6/uL


 


Hgb     (13.0-17.0)  g/dL


 


Hct     (40.0-52.0)  %


 


MCV     (82.0-92.0)  fL


 


MCH     (27.0-31.0)  pg


 


MCHC     (32.0-36.0)  g/dL


 


RDW     (11.5-14.5)  %


 


Plt Count     (150-300)  10^3/uL


 


MPV     (7.4-10.4)  fL


 


Neut % (Auto)     (50.0-70.0)  %


 


Lymph % (Auto)     (20.0-40.0)  %


 


Mono % (Auto)     (2.0-8.0)  %


 


Eos % (Auto)     (1.0-3.0)  %


 


Baso % (Auto)     (0.0-1.0)  %


 


Neut # (Auto)     (2.5-7.0)  10^3/uL


 


Lymph # (Auto)     (1.0-4.0)  10^3/uL


 


Mono # (Auto)     (0.1-0.8)  10^3/uL


 


Eos # (Auto)     (0.1-0.3)  10^3/uL


 


Baso # (Auto)     (0.0-0.1)  10^3/uL


 


Add Manual Diff     


 


Neutrophils % (Manual)     (50-70)  %


 


Lymphocytes % (Manual)     (20-40)  %


 


Monocytes % (Manual)     (2-8)  %


 


Absolute Neutrophils     


 


Lymphocytes # (Manual)     


 


Monocytes # (Manual)     


 


Platelet Estimate     


 


Clumped Platelets     


 


RBC Morph Comment     


 


PT     (8.9-11.4)  SEC


 


INR     (0.9-1.1)  


 


APTT     (20.8-31.2)  SEC


 


Sodium     (136-145)  mmol/L


 


Potassium     (3.3-5.3)  mmol/L


 


Chloride     ()  mmol/L


 


Carbon Dioxide     (21.0-32.0)  mmol/L


 


BUN     (6-25)  mg/dL


 


Creatinine     (0.51-1.17)  mg/dL


 


Est Cr Clr Drug Dosing     mL/min


 


Estimated GFR (MDRD)     mL/min


 


Glucose     ()  mg/dL


 


Lactic Acid    3.3 H  (0.4-2.0)  mmol/L


 


Calcium     (8.7-10.3)  mg/dL


 


Magnesium     (1.8-2.4)  mg/dL


 


Total Bilirubin     (0.2-1.0)  mg/dL


 


AST     (15-37)  U/L


 


ALT     (12-78)  U/L


 


Alkaline Phosphatase     ()  IU/L


 


Troponin I     (0.00-0.070)  ng/mL


 


C-Reactive Protein  2.0 H    (0.0-0.9)  mg/dL


 


B-Natriuretic Peptide     (0-100)  pg/mL


 


Total Protein     (6.4-8.2)  g/dL


 


Albumin     (3.00-4.80)  g/dL


 


Free T4     (0.59-1.17)  ng/dL


 


TSH, Ultra Sensitive     (0.340-4.820)  uIU/mL


 


Specimen Type   Urinvoid   


 


Urine Color   Yellow   (YELLOW)  


 


Urine Appearance   Clear   (CLEAR)  


 


Urine pH   5.0   (5.0-9.0)  


 


Ur Specific Gravity   1.020   (1.005-1.030)  


 


Urine Protein   Negative   (NEGATIVE)  mg/dL


 


Urine Glucose (UA)   Negative   (NEGATIVE)  mg/dL


 


Urine Ketones   Negative   (NEGATIVE)  mg/dL


 


Urine Occult Blood   Trace-intact H   (NEGATIVE)  


 


Urine Nitrite   Negative   (NEGATIVE)  


 


Urine Bilirubin   Negative   (NEGATIVE)  


 


Urine Urobilinogen   0.2   (0.2-1.0)  E.U./dL


 


Ur Leukocyte Esterase   Negative   (NEGATIVE)  


 


Urine RBC   0-5   /HPF


 


Urine WBC   0-5   /HPF


 


Ur Epithelial Cells   Few   /LPF


 


Urine Bacteria   Few   (NONE TO FEW)  /HPF














  04/12/18 04/12/18 04/12/18 Range/Units





  07:25 07:25 07:25 


 


WBC  24.3 H    (5.0-10.0)  10^3/uL


 


RBC  4.99    (4.50-6.00)  10^6/uL


 


Hgb  15.2  D    (13.0-17.0)  g/dL


 


Hct  46.7    (40.0-52.0)  %


 


MCV  93.6 H    (82.0-92.0)  fL


 


MCH  30.4    (27.0-31.0)  pg


 


MCHC  32.5    (32.0-36.0)  g/dL


 


RDW  13.9    (11.5-14.5)  %


 


Plt Count  187    (150-300)  10^3/uL


 


MPV  8.7    (7.4-10.4)  fL


 


Neut % (Auto)  97.5 H    (50.0-70.0)  %


 


Lymph % (Auto)  2.0 L    (20.0-40.0)  %


 


Mono % (Auto)  0.5 L    (2.0-8.0)  %


 


Eos % (Auto)  0.0 L    (1.0-3.0)  %


 


Baso % (Auto)  0.0    (0.0-1.0)  %


 


Neut # (Auto)  23.7 H    (2.5-7.0)  10^3/uL


 


Lymph # (Auto)  0.5 L    (1.0-4.0)  10^3/uL


 


Mono # (Auto)  0.1    (0.1-0.8)  10^3/uL


 


Eos # (Auto)  0.0 L    (0.1-0.3)  10^3/uL


 


Baso # (Auto)  0.0    (0.0-0.1)  10^3/uL


 


Add Manual Diff     


 


Neutrophils % (Manual)     (50-70)  %


 


Lymphocytes % (Manual)     (20-40)  %


 


Monocytes % (Manual)     (2-8)  %


 


Absolute Neutrophils     


 


Lymphocytes # (Manual)     


 


Monocytes # (Manual)     


 


Platelet Estimate     


 


Clumped Platelets     


 


RBC Morph Comment     


 


PT   28.3 H D   (8.9-11.4)  SEC


 


INR   2.9 H   (0.9-1.1)  


 


APTT     (20.8-31.2)  SEC


 


Sodium    149 H  (136-145)  mmol/L


 


Potassium    5.1  (3.3-5.3)  mmol/L


 


Chloride    109  ()  mmol/L


 


Carbon Dioxide    24.9  (21.0-32.0)  mmol/L


 


BUN    22  (6-25)  mg/dL


 


Creatinine    1.29 H  (0.51-1.17)  mg/dL


 


Est Cr Clr Drug Dosing    47.16  mL/min


 


Estimated GFR (MDRD)    54  mL/min


 


Glucose    150 H  ()  mg/dL


 


Lactic Acid     (0.4-2.0)  mmol/L


 


Calcium    8.3 L  (8.7-10.3)  mg/dL


 


Magnesium     (1.8-2.4)  mg/dL


 


Total Bilirubin     (0.2-1.0)  mg/dL


 


AST     (15-37)  U/L


 


ALT     (12-78)  U/L


 


Alkaline Phosphatase     ()  IU/L


 


Troponin I     (0.00-0.070)  ng/mL


 


C-Reactive Protein     (0.0-0.9)  mg/dL


 


B-Natriuretic Peptide     (0-100)  pg/mL


 


Total Protein     (6.4-8.2)  g/dL


 


Albumin     (3.00-4.80)  g/dL


 


Free T4     (0.59-1.17)  ng/dL


 


TSH, Ultra Sensitive     (0.340-4.820)  uIU/mL


 


Specimen Type     


 


Urine Color     (YELLOW)  


 


Urine Appearance     (CLEAR)  


 


Urine pH     (5.0-9.0)  


 


Ur Specific Gravity     (1.005-1.030)  


 


Urine Protein     (NEGATIVE)  mg/dL


 


Urine Glucose (UA)     (NEGATIVE)  mg/dL


 


Urine Ketones     (NEGATIVE)  mg/dL


 


Urine Occult Blood     (NEGATIVE)  


 


Urine Nitrite     (NEGATIVE)  


 


Urine Bilirubin     (NEGATIVE)  


 


Urine Urobilinogen     (0.2-1.0)  E.U./dL


 


Ur Leukocyte Esterase     (NEGATIVE)  


 


Urine RBC     /HPF


 


Urine WBC     /HPF


 


Ur Epithelial Cells     /LPF


 


Urine Bacteria     (NONE TO FEW)  /HPF











Result Diagrams: 


 04/15/18 06:45





 04/15/18 06:45


Problem List Initiated/Reviewed/Updated: Yes


Orders Last 24hrs: 


 Active Orders 24 hr











 Category Date Time Status


 


 Patient Status [ADT] Routine ADT  04/11/18 13:54 Ordered


 


 Cardiac Monitoring [RC] 0300,0700,1100,1500,1900,2300 Care  04/11/18 13:54 

Active


 


 Oxygen Therapy [RC] PRN Care  04/11/18 13:54 Active


 


 RT Aerosol Therapy [RC] ASDIRECTED Care  04/11/18 14:57 Active


 


 VTE/DVT Education [RC] PER UNIT ROUTINE Care  04/11/18 13:54 Active


 


 Vital Signs [RC] 0300,0700,1100,1500,1900,2300 Care  04/11/18 13:54 Active


 


 Gluten Free Diet [DIET] Diet  04/11/18 Dinner Active


 


 Heart Healthy Diet [DIET] Diet  04/11/18 Dinner Active


 


 CULTURE BLOOD [BC] Stat Lab  04/11/18 11:45 Received


 


 CULTURE BLOOD [BC] Stat Lab  04/11/18 12:05 Received


 


 CULTURE SPUTUM + SMEAR [RM] Routine Lab  04/11/18 14:49 Ordered


 


 VANCOMYCIN TROUGH [CHEM] Routine Lab  04/13/18 19:30 Ordered


 


 Acetaminophen [Tylenol Extra Strength] Med  04/11/18 19:11 Active





 1,000 mg PO Q8H PRN   


 


 Albuterol [Proventil Neb Soln] Med  04/11/18 14:57 Active





 2.5 mg NEB Q4HRRT PRN   


 


 Albuterol [Ventolin HFA] Med  04/11/18 18:42 Active





 0 gm INH Q4H PRN   


 


 Azithromycin [Zithromax] 500 mg Med  04/11/18 18:00 Active





 Sodium Chloride 0.9% [Normal Saline] 250 ml   





 IV Q24H   


 


 ClonazePAM [KlonoPIN] Med  04/11/18 21:00 Active





 0.5 mg PO BEDTIME   


 


 Levothyroxine [Synthroid] Med  04/11/18 21:00 Active





 50 mcg PO BEDTIME   


 


 Metoprolol Succinate [Toprol XL] Med  04/12/18 09:00 Active





 50 mg PO DAILY   


 


 Piperacillin/Tazobactam/Dext [Zosyn in Dextrose Iso- Med  04/11/18 19:00 Active





 Osmotic 3.375 GM] 3.375 gm   





 Premix Bag 1 bag   





 IV Q6H   


 


 Sodium Chloride 0.9% [Normal Saline] 1,000 ml Med  04/11/18 17:15 Active





 IV ASDIRECTED   


 


 Sodium Chloride 0.9% [Normal Saline] 250 ml Med  04/11/18 21:30 Active





 IV ASDIRECTED   


 


 Sodium Chloride 0.9% [Syrex Flush] Med  04/11/18 11:18 Active





 5 ml FLUSH Q8HR PRN   


 


 Vancomycin 1 gm Med  04/11/18 20:00 Active





 Sodium Chloride 0.9% [Normal Saline] 250 ml   





 IV Q12H   


 


 Vancomycin Pharmacy to Dose [Pharmacy to Dose - Med  04/11/18 15:00 Pending





 Vancomycin]   





 1 dose .XX ASDIRECTED   


 


 predniSONE Med  04/12/18 09:28 Active





 60 mg PO DAILY   


 


 Blood Culture x2 Reflex Set [OM.PC] Stat Oth  04/11/18 11:44 Ordered


 


 Peripheral IV Insertion Adult [OM.PC] Stat Oth  04/11/18 11:18 Ordered


 


 Resuscitation Status Routine Resus Stat  04/11/18 13:54 Ordered


 


 EKG 12 Lead [EK] Stat Ther  04/11/18 11:18 Ordered








 Medication Orders





Acetaminophen (Tylenol Extra Strength)  1,000 mg PO Q8H PRN


   PRN Reason: Pain


Albuterol (Proventil Neb Soln)  2.5 mg NEB Q4HRRT PRN


   PRN Reason: Shortness of Breath


Albuterol (Ventolin Hfa)  0 gm INH Q4H PRN


   PRN Reason: Shortness of Breath


Clonazepam (Klonopin)  0.5 mg PO BEDTIME [facility]


   Last Admin: 04/11/18 20:25  Dose: 0.5 mg


Vancomycin HCl 1 gm/ Sodium (Chloride)  270 mls @ 162 mls/hr IV Q12H [facility]


   Last Admin: 04/12/18 07:52  Dose: 162 mls/hr


   Admin: 04/11/18 20:26  Dose: 162 mls/hr


Sodium Chloride (Normal Saline)  1,000 mls @ 70 mls/hr IV ASDIRECTED [facility]


   Last Admin: 04/11/18 18:23  Dose: 70 mls/hr


Azithromycin 500 mg/ Sodium (Chloride)  250 mls @ 250 mls/hr IV Q24H [facility]


   Last Admin: 04/11/18 18:23  Dose: 250 mls/hr


Piperacillin/Tazobactam/ (Dextrose 3.375 gm/ Premix)  50 mls @ 100 mls/hr IV 

Q6H [facility]


   Last Admin: 04/12/18 06:18  Dose: 100 mls/hr


   Infusion: 04/12/18 01:28  Dose: 100 mls/hr


   Admin: 04/12/18 00:58  Dose: 100 mls/hr


   Infusion: 04/11/18 20:20  Dose: 100 mls/hr


   Admin: 04/11/18 19:50  Dose: 100 mls/hr


Sodium Chloride (Normal Saline)  250 mls @ 250 mls/hr IV ASDIRECTED [facility]


Levothyroxine Sodium (Synthroid)  50 mcg PO BEDTIME [facility]


   Last Admin: 04/11/18 20:25  Dose: 50 mcg


Metoprolol Succinate (Toprol Xl)  50 mg PO DAILY [facility]


   Last Admin: 04/12/18 09:29  Dose: 50 mg


Prednisone (Prednisone)  60 mg PO DAILY [facility]


   Stop: 04/13/18 23:59


   Last Admin: 04/12/18 09:32  Dose: 60 mg


Sodium Chloride (Syrex Flush)  5 ml FLUSH Q8HR PRN


   PRN Reason: Keep Vein Open


Vancomycin HCl (Pharmacy To Dose - Vancomycin)  1 dose .XX ASDIRECTED [facility]








Assessment/Plan Comment:: 


HISTORY OF PRESENT ILLNESS


This 82-year-old gentleman yesterday was admitted through the ED due to atrial 

fibrillation and shortness of breath. He was initially evaluated at an outlying 

[facility] however due to limited availability for diagnostics patient was 

sent to ED for further evaluation and workup.  Patient states that he was 

feeling well 2 days ago, but then started developing up shortness of breath and 

feeling of increased heart rate.  Symptoms worsened about 4 o'clock morning of 

admission--prompting outpatient appointment. On admission in the ED he denied 

any chest pain, fever, nausea, vomiting, abdominal pain, peripheral edema, 

headache, vision changes, or any change in medication. Proximally 2 weeks ago 

patient was discharged from hospital with bilateral lower lobe pneumonia and a 

subsequent follow-up from hospitalization he was doing very well without any 

symptoms. 





Pertinent ED findings


White count 30,000 neutrophilia


Hypercoagulable state





Chest x-ray [facility], New consolidation right base, inflammatory 

pneumonitis, No significant pleural fluid or evidence of pneumothorax. No 

pulmonary venous distention.


_______________________________________________________





Update since arrival to the floor; Upon arrival to the floor patient required 

significant ongoing assessment and intervention due to signs and symptoms of 

sepsis with hypotension requiring aggressive fluid resuscitation medication 

changes and lab additions











Principal diagnosis


Sepsis--likely due to pneumonia


Pneumonia RL, POA, likely CAP, rule out atypical


Hypernatremia, hypotonic





Chronic problems


Atrial fibrillation


HLD


CKD


CHF


Hypothyroidism


RLS


Recent gout flareup


Long-term anticoagulant therapy














CV:  Hypotension due to sepsis, much improved fluid resuscitation/boluses 

throughout the day and evening, qSOFA yesterday 3/3, today 0/3 much improved --

MAP improved, did not require pressors.  No longer tachycardia or tachypnea,  

blood pressure now normalized with normal MAP. No signs of end-organ failure, 

A. fib, [id number] high, Rate control strategy; throughout admission holding 

beta blocker due to sepsis, will reinstitute back tonight if continues to be 

hemodynamically stable. INR now normal, start back on Coumadin. BNP 54





PULMONARY: Pulmonary status has improved, longer hypoxic, requiring less oxygen

, no sputum production, pox 96%,  chest x-ray demonstrated new consolidation 

right base, no need for ABG, sputum collection. Oral prednisone, Add Mucinex. 

On antibiotics





INFECTIOUS DISEASE: New consolidation right base, inflammatory pneumonitis, 

added Zosyn last night, continue with azithromycin and vancomycin. White count 

improving, lactic acid 3.3 yesterday. CRP 2.0. Pro-calcitonin pending. Attempt 

to induce sputum today. BC surveillance. Droplet precautions, assess 

immunization pneumococcal status.





HEMATOLOGY:  Hgb/Hct normal. 





FLUIDS, ELECTROLYTES, AND RENAL:  I/O, intake 2982, output 2800, (bolused 

throughout the day due to sepsis), continue with I&O, no Florez catheter needed, 

change to half saline today and decrease fluids today. BNP 54. No signs of 

fluid overload. Creatinine 1.29.





ENDOCRINE:  TSH normal, continue with supplemental therapy. 





GI: No vomiting, taking by mouth fluids.





HEALTH MAINTENANCE:  DVT prophylaxis, cross covered with Coumadin. Assess 

pneumococcal vaccine status








Overall plan, continue antibiotics, decrease IV fluids, change IV fluids, add 

Mucinex, can removed from telemetry status, reinitiate beta blockers tonight, 

blood culture surveillance, monitor for any endorgan system and ongoing sepsis

## 2018-04-13 LAB
CHLORIDE SERPL-SCNC: 112 MMOL/L (ref 98–115)
SODIUM SERPL-SCNC: 149 MMOL/L (ref 136–145)

## 2018-04-13 RX ADMIN — TAZOBACTAM SODIUM AND PIPERACILLIN SODIUM SCH MLS/HR: 375; 3 INJECTION, SOLUTION INTRAVENOUS at 06:01

## 2018-04-13 RX ADMIN — TAZOBACTAM SODIUM AND PIPERACILLIN SODIUM SCH MLS/HR: 375; 3 INJECTION, SOLUTION INTRAVENOUS at 19:06

## 2018-04-13 RX ADMIN — TAZOBACTAM SODIUM AND PIPERACILLIN SODIUM SCH MLS/HR: 375; 3 INJECTION, SOLUTION INTRAVENOUS at 13:17

## 2018-04-13 RX ADMIN — TAZOBACTAM SODIUM AND PIPERACILLIN SODIUM SCH MLS/HR: 375; 3 INJECTION, SOLUTION INTRAVENOUS at 00:51

## 2018-04-13 NOTE — PCM.PN
- General Info


Date of Service: 04/13/18


Admission Dx/Problem (Free Text): 


 Admission Diagnosis/Problem





Admission Diagnosis/Problem      Pneumonia








Functional Status: Reports: Tolerating Diet





- Patient Data


Vitals - Most Recent: 


 Last Vital Signs











Temp  97.9 F   04/13/18 07:00


 


Pulse  98   04/13/18 08:17


 


Resp  18   04/13/18 07:00


 


BP  117/82   04/13/18 08:17


 


Pulse Ox  95   04/13/18 07:00











Weight - Most Recent: 200 lb 1.6 oz


I&O - Last 24 Hours: 


 Intake & Output











 04/12/18 04/13/18 04/13/18





 22:59 06:59 14:59


 


Intake Total 990 650 


 


Output Total 200 400 


 


Balance 790 250 











Lab Results Last 24 Hours: 


 Laboratory Results - last 24 hr











  04/13/18 04/13/18 04/13/18 Range/Units





  07:35 07:35 07:35 


 


WBC  26.7 H    (5.0-10.0)  10^3/uL


 


RBC  4.82    (4.50-6.00)  10^6/uL


 


Hgb  14.9    (13.0-17.0)  g/dL


 


Hct  44.7    (40.0-52.0)  %


 


MCV  92.7 H    (82.0-92.0)  fL


 


MCH  30.9    (27.0-31.0)  pg


 


MCHC  33.4    (32.0-36.0)  g/dL


 


RDW  14.0    (11.5-14.5)  %


 


Plt Count  165    (150-300)  10^3/uL


 


MPV  9.2    (7.4-10.4)  fL


 


Neut % (Auto)  93.6 H    (50.0-70.0)  %


 


Lymph % (Auto)  2.5 L    (20.0-40.0)  %


 


Mono % (Auto)  3.9    (2.0-8.0)  %


 


Eos % (Auto)  0.0 L    (1.0-3.0)  %


 


Baso % (Auto)  0.0    (0.0-1.0)  %


 


Neut # (Auto)  25.0 H    (2.5-7.0)  10^3/uL


 


Lymph # (Auto)  0.7 L    (1.0-4.0)  10^3/uL


 


Mono # (Auto)  1.0 H    (0.1-0.8)  10^3/uL


 


Eos # (Auto)  0.0 L    (0.1-0.3)  10^3/uL


 


Baso # (Auto)  0.0    (0.0-0.1)  10^3/uL


 


PT     (8.9-11.4)  SEC


 


INR     (0.9-1.1)  


 


Sodium   149 H   (136-145)  mmol/L


 


Potassium   4.8   (3.3-5.3)  mmol/L


 


Chloride   112   ()  mmol/L


 


Carbon Dioxide   25.9   (21.0-32.0)  mmol/L


 


BUN   22   (6-25)  mg/dL


 


Creatinine   1.20 H   (0.51-1.17)  mg/dL


 


Est Cr Clr Drug Dosing   50.69   mL/min


 


Estimated GFR (MDRD)   58   mL/min


 


Glucose   137 H   ()  mg/dL


 


Lactic Acid    2.1 H  (0.4-2.0)  mmol/L


 


Calcium   8.8   (8.7-10.3)  mg/dL


 


C-Reactive Protein   5.5 H   (0.0-0.9)  mg/dL














  04/13/18 Range/Units





  07:35 


 


WBC   (5.0-10.0)  10^3/uL


 


RBC   (4.50-6.00)  10^6/uL


 


Hgb   (13.0-17.0)  g/dL


 


Hct   (40.0-52.0)  %


 


MCV   (82.0-92.0)  fL


 


MCH   (27.0-31.0)  pg


 


MCHC   (32.0-36.0)  g/dL


 


RDW   (11.5-14.5)  %


 


Plt Count   (150-300)  10^3/uL


 


MPV   (7.4-10.4)  fL


 


Neut % (Auto)   (50.0-70.0)  %


 


Lymph % (Auto)   (20.0-40.0)  %


 


Mono % (Auto)   (2.0-8.0)  %


 


Eos % (Auto)   (1.0-3.0)  %


 


Baso % (Auto)   (0.0-1.0)  %


 


Neut # (Auto)   (2.5-7.0)  10^3/uL


 


Lymph # (Auto)   (1.0-4.0)  10^3/uL


 


Mono # (Auto)   (0.1-0.8)  10^3/uL


 


Eos # (Auto)   (0.1-0.3)  10^3/uL


 


Baso # (Auto)   (0.0-0.1)  10^3/uL


 


PT  32.4 H  (8.9-11.4)  SEC


 


INR  3.3 H  (0.9-1.1)  


 


Sodium   (136-145)  mmol/L


 


Potassium   (3.3-5.3)  mmol/L


 


Chloride   ()  mmol/L


 


Carbon Dioxide   (21.0-32.0)  mmol/L


 


BUN   (6-25)  mg/dL


 


Creatinine   (0.51-1.17)  mg/dL


 


Est Cr Clr Drug Dosing   mL/min


 


Estimated GFR (MDRD)   mL/min


 


Glucose   ()  mg/dL


 


Lactic Acid   (0.4-2.0)  mmol/L


 


Calcium   (8.7-10.3)  mg/dL


 


C-Reactive Protein   (0.0-0.9)  mg/dL











Sergio Results Last 24 Hours: 


 Microbiology











 04/11/18 12:05 Aerobic Blood Culture - Preliminary





 Blood - Venous - Lab Draw    NO GROWTH AFTER 1 DAY





 Anaerobic Blood Culture - Preliminary





    NO GROWTH AFTER 1 DAY


 


 04/11/18 11:45 Aerobic Blood Culture - Preliminary





 Blood - Venous    NO GROWTH AFTER 1 DAY





 Anaerobic Blood Culture - Preliminary





    NO GROWTH AFTER 1 DAY











Med Orders - Current: 


 Current Medications





Acetaminophen (Tylenol Extra Strength)  1,000 mg PO Q8H PRN


   PRN Reason: Pain


Albuterol (Proventil Neb Soln)  2.5 mg NEB Q4HRRT PRN


   PRN Reason: Shortness of Breath


Albuterol (Ventolin Hfa)  0 gm INH Q4H PRN


   PRN Reason: Shortness of Breath


Atropine Sulfate (Atropine 0.1 Mg/Ml)  0 mg IVPUSH ASDIRECTED PRN


   PRN Reason: Heart


Clonazepam (Klonopin)  0.5 mg PO BEDTIME [facility]


   Last Admin: 04/12/18 21:22 Dose:  0.5 mg


Epinephrine HCl (Epinephrine 1:10,000)  1 mg IVPUSH ASDIRECTED PRN


   PRN Reason: Heart


Guaifenesin (Mucinex)  600 mg PO BID [facility]


   Last Admin: 04/13/18 08:18 Dose:  600 mg


Vancomycin HCl 1 gm/ Sodium (Chloride)  270 mls @ 162 mls/hr IV Q12H [facility]


   Last Admin: 04/13/18 08:09 Dose:  162 mls/hr


Piperacillin/Tazobactam/ (Dextrose 3.375 gm/ Premix)  50 mls @ 100 mls/hr IV 

Q6H [facility]


   Last Admin: 04/13/18 06:01 Dose:  100 mls/hr


Sodium Chloride (Normal Saline)  250 mls @ 250 mls/hr IV ASDIRECTED [facility]


Dextrose/Sodium Chloride (Dextrose 5%-1/2 Ns)  1,000 mls @ 60 mls/hr IV 

ASDIRECTED [facility]


   Last Admin: 04/12/18 12:15 Dose:  60 mls/hr


Levothyroxine Sodium (Synthroid)  50 mcg PO BEDTIME [facility]


   Last Admin: 04/12/18 21:22 Dose:  50 mcg


Lidocaine HCl (Xylocaine 2%)  0 mg IVPUSH ASDIRECTED PRN


   PRN Reason: Heart


Metoprolol Succinate (Toprol Xl)  50 mg PO DAILY [facility]


   Last Admin: 04/13/18 08:17 Dose:  50 mg


Nitroglycerin (Nitrostat)  0.4 mg SL ASDIRECTED PRN


   PRN Reason: Heart


Prednisone (Prednisone)  40 mg PO DAILY [facility]; Taper


   Stop: 04/17/18 09:59


Sodium Chloride (Syrex Flush)  5 ml FLUSH Q8HR PRN


   PRN Reason: Keep Vein Open


Vancomycin HCl (Pharmacy To Dose - Vancomycin)  1 dose .XX ASDIRECTED [facility]


Warfarin Sodium (Coumadin)  5 mg PO SUTUWETHSA@2100 [facility]


   Last Admin: 04/12/18 21:22 Dose:  5 mg





Discontinued Medications





Acetaminophen (Tylenol Extra Strength)  1,000 mg PO ASDIRECTED PRN


   PRN Reason: Pain


Ceftriaxone Sodium (Rocephin) Confirm Administered Dose 1 gm .ROUTE .STK-MED ONE


   Stop: 04/11/18 13:11


   Last Admin: 04/11/18 13:46 Dose:  Not Given


Ceftriaxone Sodium (Rocephin)  1 gm IVPUSH ONETIME ONE


   Stop: 04/11/18 13:15


   Last Admin: 04/11/18 13:10 Dose:  1 gm


Sodium Chloride (Normal Saline)  1,000 mls @ 70 mls/hr IV ASDIRECTED [facility]


   Last Admin: 04/11/18 18:23 Dose:  70 mls/hr


Azithromycin 500 mg/ Sodium (Chloride)  250 mls @ 250 mls/hr IV Q24H [facility]


   Last Admin: 04/12/18 18:14 Dose:  250 mls/hr


Sodium Chloride (Normal Saline)  600 mls @ 200 mls/hr IV .BOLUS ONE


   Stop: 04/11/18 21:50


   Last Admin: 04/11/18 19:47 Dose:  200 mls/hr


Non-Formulary Medication (Ipratropium [Atrovent 0.03% Nasal Spray])  2 spray 

NASBOTH TID [facility]


Non-Formulary Medication (Mecobalamin [B-12])  1,000 mcg SL Q48H [facility]


   Last Admin: 04/12/18 07:42 Dose:  Not Given


Prednisone (Prednisone)  60 mg PO DAILY [facility]


   Stop: 04/13/18 23:59


   Last Admin: 04/12/18 12:24 Dose:  Not Given


Prednisone (Prednisone)  60 mg PO DAILY [facility]


   Stop: 04/13/18 23:59


   Last Admin: 04/12/18 09:32 Dose:  60 mg


Warfarin Sodium (Coumadin)  2.5 mg PO MOFR@2100 [facility]











- Problem List Review


Problem List Initiated/Reviewed/Updated: Yes





- My Orders


Last 24 Hours: 


My Active Orders





04/13/18 10:00


predniSONE   40 mg PO DAILY 





04/13/18 10:02


Discontinue Telemetry Monitoring [Cardiac Monitoring Discontinue] [RC] Click to 

Edit 





04/14/18 05:15


CBC WITH AUTO DIFF [HEME] AM 


COMPREHENSIVE METABOLIC PN,CMP [CHEM] AM 














- Assessment


Assessment:: 


This very pleasant 82-year-old patient was admitted yesterday because of a 

right lower lobe pneumonia is being seen today for follow-up. He is feeling 

much better. Coughing has improved. Dyspnea has improved. His vital signs 

reveal his blood pressure to be 117/82. Pulse rate is 98. Temperature is 97.9.


 Intake & Output











 04/11/18 04/12/18 04/13/18 04/14/18





 06:59 06:59 06:59 06:59


 


Intake Total  2982 2805 


 


Output Total  200 1150 


 


Balance  2782 1655 











 Laboratory Last Values











WBC  26.7 10^3/uL (5.0-10.0)  H  04/13/18  07:35    


 


RBC  4.82 10^6/uL (4.50-6.00)   04/13/18  07:35    


 


Hgb  14.9 g/dL (13.0-17.0)   04/13/18  07:35    


 


Hct  44.7 % (40.0-52.0)   04/13/18  07:35    


 


MCV  92.7 fL (82.0-92.0)  H  04/13/18  07:35    


 


MCH  30.9 pg (27.0-31.0)   04/13/18  07:35    


 


MCHC  33.4 g/dL (32.0-36.0)   04/13/18  07:35    


 


RDW  14.0 % (11.5-14.5)   04/13/18  07:35    


 


Plt Count  165 10^3/uL (150-300)   04/13/18  07:35    


 


MPV  9.2 fL (7.4-10.4)   04/13/18  07:35    


 


Neut % (Auto)  93.6 % (50.0-70.0)  H  04/13/18  07:35    


 


Lymph % (Auto)  2.5 % (20.0-40.0)  L  04/13/18  07:35    


 


Mono % (Auto)  3.9 % (2.0-8.0)   04/13/18  07:35    


 


Eos % (Auto)  0.0 % (1.0-3.0)  L  04/13/18  07:35    


 


Baso % (Auto)  0.0 % (0.0-1.0)   04/13/18  07:35    


 


Neut # (Auto)  25.0 10^3/uL (2.5-7.0)  H  04/13/18  07:35    


 


Lymph # (Auto)  0.7 10^3/uL (1.0-4.0)  L  04/13/18  07:35    


 


Mono # (Auto)  1.0 10^3/uL (0.1-0.8)  H  04/13/18  07:35    


 


Eos # (Auto)  0.0 10^3/uL (0.1-0.3)  L  04/13/18  07:35    


 


Baso # (Auto)  0.0 10^3/uL (0.0-0.1)   04/13/18  07:35    


 


Add Manual Diff  Yes   04/11/18  11:25    


 


Neutrophils % (Manual)  97 % (50-70)  H  04/11/18  11:25    


 


Lymphocytes % (Manual)  2 % (20-40)  L  04/11/18  11:25    


 


Monocytes % (Manual)  1 % (2-8)  L  04/11/18  11:25    


 


Absolute Neutrophils  29.1970   04/11/18  11:25    


 


Lymphocytes # (Manual)  0.6020   04/11/18  11:25    


 


Monocytes # (Manual)  0.3010   04/11/18  11:25    


 


Platelet Estimate  Adequate   04/11/18  11:25    


 


Clumped Platelets  Few   04/11/18  11:25    


 


RBC Morph Comment  See note   04/11/18  11:25    


 


PT  32.4 SEC (8.9-11.4)  H  04/13/18  07:35    


 


INR  3.3  (0.9-1.1)  H  04/13/18  07:35    


 


APTT  39.3 SEC (20.8-31.2)  H  04/11/18  11:25    


 


Sodium  149 mmol/L (136-145)  H  04/13/18  07:35    


 


Potassium  4.8 mmol/L (3.3-5.3)   04/13/18  07:35    


 


Chloride  112 mmol/L ()   04/13/18  07:35    


 


Carbon Dioxide  25.9 mmol/L (21.0-32.0)   04/13/18  07:35    


 


BUN  22 mg/dL (6-25)   04/13/18  07:35    


 


Creatinine  1.20 mg/dL (0.51-1.17)  H  04/13/18  07:35    


 


Est Cr Clr Drug Dosing  50.69 mL/min  04/13/18  07:35    


 


Estimated GFR (MDRD)  58 mL/min  04/13/18  07:35    


 


Glucose  137 mg/dL ()  H  04/13/18  07:35    


 


Lactic Acid  2.1 mmol/L (0.4-2.0)  H  04/13/18  07:35    


 


Calcium  8.8 mg/dL (8.7-10.3)   04/13/18  07:35    


 


Magnesium  1.9 mg/dL (1.8-2.4)   04/11/18  11:25    


 


Total Bilirubin  1.2 mg/dL (0.2-1.0)  H  04/11/18  11:25    


 


AST  36 U/L (15-37)   04/11/18  11:25    


 


ALT  62 U/L (12-78)   04/11/18  11:25    


 


Alkaline Phosphatase  80 IU/L ()   04/11/18  11:25    


 


Troponin I  < 0.04 ng/mL (0.00-0.070)   04/11/18  11:25    


 


C-Reactive Protein  5.5 mg/dL (0.0-0.9)  H  04/13/18  07:35    


 


B-Natriuretic Peptide  54 pg/mL (0-100)   04/11/18  11:25    


 


Total Protein  6.8 g/dL (6.4-8.2)   04/11/18  11:25    


 


Albumin  3.47 g/dL (3.00-4.80)   04/11/18  11:25    


 


Free T4  0.95 ng/dL (0.59-1.17)   04/11/18  11:25    


 


TSH, Ultra Sensitive  1.120 uIU/mL (0.340-4.820)   04/11/18  11:25    


 


Specimen Type  Urinvoid   04/11/18  13:30    


 


Urine Color  Yellow  (YELLOW)   04/11/18  13:30    


 


Urine Appearance  Clear  (CLEAR)   04/11/18  13:30    


 


Urine pH  5.0  (5.0-9.0)   04/11/18  13:30    


 


Ur Specific Gravity  1.020  (1.005-1.030)   04/11/18  13:30    


 


Urine Protein  Negative mg/dL (NEGATIVE)   04/11/18  13:30    


 


Urine Glucose (UA)  Negative mg/dL (NEGATIVE)   04/11/18  13:30    


 


Urine Ketones  Negative mg/dL (NEGATIVE)   04/11/18  13:30    


 


Urine Occult Blood  Trace-intact  (NEGATIVE)  H  04/11/18  13:30    


 


Urine Nitrite  Negative  (NEGATIVE)   04/11/18  13:30    


 


Urine Bilirubin  Negative  (NEGATIVE)   04/11/18  13:30    


 


Urine Urobilinogen  0.2 E.U./dL (0.2-1.0)   04/11/18  13:30    


 


Ur Leukocyte Esterase  Negative  (NEGATIVE)   04/11/18  13:30    


 


Urine RBC  0-5 /HPF  04/11/18  13:30    


 


Urine WBC  0-5 /HPF  04/11/18  13:30    


 


Ur Epithelial Cells  Few /LPF  04/11/18  13:30    


 


Urine Bacteria  Few /HPF (NONE TO FEW)   04/11/18  13:30    








 Vital Signs - 24 hr











  04/12/18 04/12/18 04/12/18





  11:00 11:30 15:00


 


Temperature [ 97.8 F  98.0 F





Temporal]   


 


Pulse,   





Peripheral   


 


Pulse, 86  91





Peripheral [   





Pulse Oximetry]   


 


Respiratory 16  22 H





Rate   


 


Blood Pressure   


 


Blood Pressure   97/72





[Left Upper Arm   





]   


 


Blood Pressure 99/66  





[Right Upper   





Arm]   


 


O2 Sat by Pulse 95  94 L





Oximetry   


 


O2 Sat by Pulse  94 L 





Oximetry [Room   





Air]   














  04/12/18 04/12/18 04/13/18





  19:00 22:50 03:00


 


Temperature [ 97.8 F 97.6 F 97.6 F





Temporal]   


 


Pulse,   





Peripheral   


 


Pulse, 100 87 87





Peripheral [   





Pulse Oximetry]   


 


Respiratory 24 H 16 20





Rate   


 


Blood Pressure   


 


Blood Pressure   





[Left Upper Arm   





]   


 


Blood Pressure 139/79 118/82 109/73





[Right Upper   





Arm]   


 


O2 Sat by Pulse 98 94 L 92 L





Oximetry   


 


O2 Sat by Pulse   





Oximetry [Room   





Air]   














  04/13/18 04/13/18





  07:00 08:17


 


Temperature [ 97.9 F 





Temporal]  


 


Pulse,  98





Peripheral  


 


Pulse, 99 





Peripheral [  





Pulse Oximetry]  


 


Respiratory 18 





Rate  


 


Blood Pressure  117/82


 


Blood Pressure  





[Left Upper Arm  





]  


 


Blood Pressure 117/82 





[Right Upper  





Arm]  


 


O2 Sat by Pulse 95 





Oximetry  


 


O2 Sat by Pulse 95 





Oximetry [Room  





Air]  








Problem #1 pneumonia right side: Chest x-ray shows new consolidation of the 

right base stent with pneumonia. Patient is definitely improved today. We'll 

plan to continue with his antibiotics with Zosyn and vancomycin and discontinue 

Zithromax.


Cardiovascular: The patient's hemodynamic status has improved quite nicely. The 

patient has received IV fluids throughout the day and his hemodynamic status 

has improved. He is no longer tachycardic. He does remain in atrial 

fibrillation with a high chads vascular score. Currently back on Coumadin. BNP 

is normal at 54.


Health maintenance: DVT prophylaxis will continue. Intake output status is also 

good.








- Plan


Plan:: 


HISTORY OF PRESENT ILLNESS


This 82-year-old gentleman yesterday was admitted through the ED due to atrial 

fibrillation and shortness of breath. He was initially evaluated at an [facility] however due to limited availability for diagnostics patient was 

sent to ED for further evaluation and workup.  Patient states that he was 

feeling well 2 days ago, but then started developing up shortness of breath and 

feeling of increased heart rate.  Symptoms worsened about 4 o'clock morning of 

admission--prompting outpatient appointment. On admission in the ED he denied 

any chest pain, fever, nausea, vomiting, abdominal pain, peripheral edema, 

headache, vision changes, or any change in medication. Proximally 2 weeks ago 

patient was discharged from hospital with bilateral lower lobe pneumonia and a 

subsequent follow-up from hospitalization he was doing very well without any 

symptoms. 





Pertinent ED findings


White count 30,000 neutrophilia


Hypercoagulable state





Chest x-ray [facility], New consolidation right base, inflammatory 

pneumonitis, No significant pleural fluid or evidence of pneumothorax. No 

pulmonary venous distention.


_______________________________________________________





Update since arrival to the floor; Upon arrival to the floor patient required 

significant ongoing assessment and intervention due to signs and symptoms of 

sepsis with hypotension requiring aggressive fluid resuscitation medication 

changes and lab additions











Principal diagnosis


Sepsis--likely due to pneumonia


Pneumonia RL, POA, likely CAP, rule out atypical


Hypernatremia, hypotonic





Chronic problems


Atrial fibrillation


HLD


CKD


CHF


Hypothyroidism


RLS


Recent gout flareup


Long-term anticoagulant therapy














CV:  Hypotension due to sepsis, much improved fluid resuscitation/boluses 

throughout the day and evening, qSOFA yesterday 3/3, today 0/3 much improved --

MAP improved, did not require pressors.  No longer tachycardia or tachypnea,  

blood pressure now normalized with normal MAP. No signs of end-organ failure, 

A. fib, [id number] high, Rate control strategy; throughout admission holding 

beta blocker due to sepsis, will reinstitute back tonight if continues to be 

hemodynamically stable. INR now normal, start back on Coumadin. BNP 54





PULMONARY: Pulmonary status has improved, longer hypoxic, requiring less oxygen

, no sputum production, pox 96%,  chest x-ray demonstrated new consolidation 

right base, no need for ABG, sputum collection. Oral prednisone, Add Mucinex. 

On antibiotics





INFECTIOUS DISEASE: New consolidation right base, inflammatory pneumonitis, 

added Zosyn last night, continue with azithromycin and vancomycin. White count 

improving, lactic acid 3.3 yesterday. CRP 2.0. Pro-calcitonin pending. Attempt 

to induce sputum today. BC surveillance. Droplet precautions, assess 

immunization pneumococcal status.





HEMATOLOGY:  Hgb/Hct normal. 





FLUIDS, ELECTROLYTES, AND RENAL:  I/O, intake 2982, output 2800, (bolused 

throughout the day due to sepsis), continue with I&O, no Florez catheter needed, 

change to half saline today and decrease fluids today. BNP 54. No signs of 

fluid overload. Creatinine 1.29.





ENDOCRINE:  TSH normal, continue with supplemental therapy. 





GI: No vomiting, taking by mouth fluids.





HEALTH MAINTENANCE:  DVT prophylaxis, cross covered with Coumadin. Assess 

pneumococcal vaccine status








Overall plan, continue antibiotics, decrease IV fluids, change IV fluids, add 

Mucinex, can removed from telemetry status, reinitiate beta blockers tonight, 

blood culture surveillance, monitor for any endorgan system and ongoing sepsis

## 2018-04-14 LAB
CHLORIDE SERPL-SCNC: 111 MMOL/L (ref 98–115)
SODIUM SERPL-SCNC: 149 MMOL/L (ref 136–145)

## 2018-04-14 RX ADMIN — TAZOBACTAM SODIUM AND PIPERACILLIN SODIUM SCH MLS/HR: 375; 3 INJECTION, SOLUTION INTRAVENOUS at 06:44

## 2018-04-14 RX ADMIN — TAZOBACTAM SODIUM AND PIPERACILLIN SODIUM SCH MLS/HR: 375; 3 INJECTION, SOLUTION INTRAVENOUS at 18:45

## 2018-04-14 RX ADMIN — TAZOBACTAM SODIUM AND PIPERACILLIN SODIUM SCH MLS/HR: 375; 3 INJECTION, SOLUTION INTRAVENOUS at 13:00

## 2018-04-14 RX ADMIN — TAZOBACTAM SODIUM AND PIPERACILLIN SODIUM SCH MLS/HR: 375; 3 INJECTION, SOLUTION INTRAVENOUS at 00:51

## 2018-04-14 NOTE — PCM.PN
- General Info


Date of Service: 04/14/18


Subjective Update: 


Mr. Mccormick reports continued improvement in his breathing since admission. 

Continuing to have cough, though improved. Has had onset of loose--not liquid--

stools and resultant flare of his hemorrhoids. Tolerating diet well. Voiding 

well. Denies fever, chills, shortness of breath, chest pain, abdominal pain, 

rectal bleeding, dysuria, rash, or new concerns.





No nursing concerns.





- Patient Data


Vitals - Most Recent: 


 Last Vital Signs











Temp  36.4 C   04/14/18 11:00


 


Pulse  69   04/14/18 11:00


 


Resp  16   04/14/18 11:00


 


BP  127/76   04/14/18 11:00


 


Pulse Ox  96   04/14/18 11:00











Weight - Most Recent: 90.764 kg


I&O - Last 24 Hours: 


 Intake & Output











 04/13/18 04/14/18 04/14/18





 22:59 06:59 14:59


 


Intake Total 920 741 


 


Output Total 500 125 


 


Balance 420 616 











Lab Results Last 24 Hours: 


 Laboratory Results - last 24 hr











  04/13/18 04/14/18 04/14/18 Range/Units





  19:30 07:20 07:20 


 


WBC    17.2 H  (5.0-10.0)  10^3/uL


 


RBC    4.59  (4.50-6.00)  10^6/uL


 


Hgb    14.0  (13.0-17.0)  g/dL


 


Hct    42.7  (40.0-52.0)  %


 


MCV    93.0 H  (82.0-92.0)  fL


 


MCH    30.5  (27.0-31.0)  pg


 


MCHC    32.8  (32.0-36.0)  g/dL


 


RDW    14.0  (11.5-14.5)  %


 


Plt Count    181  (150-300)  10^3/uL


 


MPV    9.1  (7.4-10.4)  fL


 


Neut % (Auto)    91.5 H  (50.0-70.0)  %


 


Lymph % (Auto)    3.8 L  (20.0-40.0)  %


 


Mono % (Auto)    4.2  (2.0-8.0)  %


 


Eos % (Auto)    0.3 L  (1.0-3.0)  %


 


Baso % (Auto)    0.2  (0.0-1.0)  %


 


Neut # (Auto)    15.7 H  (2.5-7.0)  10^3/uL


 


Lymph # (Auto)    0.7 L  (1.0-4.0)  10^3/uL


 


Mono # (Auto)    0.7  (0.1-0.8)  10^3/uL


 


Eos # (Auto)    0.1  (0.1-0.3)  10^3/uL


 


Baso # (Auto)    0.0  (0.0-0.1)  10^3/uL


 


PT     (8.9-11.4)  SEC


 


INR     (0.9-1.1)  


 


Sodium   149 H   (136-145)  mmol/L


 


Potassium   4.4   (3.3-5.3)  mmol/L


 


Chloride   111   ()  mmol/L


 


Carbon Dioxide   26.5   (21.0-32.0)  mmol/L


 


BUN   21   (6-25)  mg/dL


 


Creatinine   1.29 H   (0.51-1.17)  mg/dL


 


Est Cr Clr Drug Dosing   47.16   mL/min


 


Estimated GFR (MDRD)   54   mL/min


 


Glucose   109   ()  mg/dL


 


Calcium   8.4 L   (8.7-10.3)  mg/dL


 


Total Bilirubin   0.9   (0.2-1.0)  mg/dL


 


AST   44 H   (15-37)  U/L


 


ALT   86 H   (12-78)  U/L


 


Alkaline Phosphatase   55   ()  IU/L


 


Total Protein   6.2 L   (6.4-8.2)  g/dL


 


Albumin   2.74 L   (3.00-4.80)  g/dL


 


Vancomycin Trough  15.4    (10-20)  ug/mL














  04/14/18 Range/Units





  07:20 


 


WBC   (5.0-10.0)  10^3/uL


 


RBC   (4.50-6.00)  10^6/uL


 


Hgb   (13.0-17.0)  g/dL


 


Hct   (40.0-52.0)  %


 


MCV   (82.0-92.0)  fL


 


MCH   (27.0-31.0)  pg


 


MCHC   (32.0-36.0)  g/dL


 


RDW   (11.5-14.5)  %


 


Plt Count   (150-300)  10^3/uL


 


MPV   (7.4-10.4)  fL


 


Neut % (Auto)   (50.0-70.0)  %


 


Lymph % (Auto)   (20.0-40.0)  %


 


Mono % (Auto)   (2.0-8.0)  %


 


Eos % (Auto)   (1.0-3.0)  %


 


Baso % (Auto)   (0.0-1.0)  %


 


Neut # (Auto)   (2.5-7.0)  10^3/uL


 


Lymph # (Auto)   (1.0-4.0)  10^3/uL


 


Mono # (Auto)   (0.1-0.8)  10^3/uL


 


Eos # (Auto)   (0.1-0.3)  10^3/uL


 


Baso # (Auto)   (0.0-0.1)  10^3/uL


 


PT  34.2 H  (8.9-11.4)  SEC


 


INR  3.5 H  (0.9-1.1)  


 


Sodium   (136-145)  mmol/L


 


Potassium   (3.3-5.3)  mmol/L


 


Chloride   ()  mmol/L


 


Carbon Dioxide   (21.0-32.0)  mmol/L


 


BUN   (6-25)  mg/dL


 


Creatinine   (0.51-1.17)  mg/dL


 


Est Cr Clr Drug Dosing   mL/min


 


Estimated GFR (MDRD)   mL/min


 


Glucose   ()  mg/dL


 


Calcium   (8.7-10.3)  mg/dL


 


Total Bilirubin   (0.2-1.0)  mg/dL


 


AST   (15-37)  U/L


 


ALT   (12-78)  U/L


 


Alkaline Phosphatase   ()  IU/L


 


Total Protein   (6.4-8.2)  g/dL


 


Albumin   (3.00-4.80)  g/dL


 


Vancomycin Trough   (10-20)  ug/mL











Sergio Results Last 24 Hours: 


 Microbiology











 04/11/18 12:05 Aerobic Blood Culture - Preliminary





 Blood - Venous - Lab Draw    NO GROWTH AFTER 2 DAYS





 Anaerobic Blood Culture - Preliminary





    NO GROWTH AFTER 2 DAYS


 


 04/11/18 11:45 Aerobic Blood Culture - Preliminary





 Blood - Venous    NO GROWTH AFTER 2 DAYS





 Anaerobic Blood Culture - Preliminary





    NO GROWTH AFTER 2 DAYS











Med Orders - Current: 


 Current Medications





Acetaminophen (Tylenol Extra Strength)  1,000 mg PO Q8H PRN


   PRN Reason: Pain


Albuterol (Proventil Neb Soln)  2.5 mg NEB Q4HRRT PRN


   PRN Reason: Shortness of Breath


Albuterol (Ventolin Hfa)  0 gm INH Q4H PRN


   PRN Reason: Shortness of Breath


Atropine Sulfate (Atropine 0.1 Mg/Ml)  0 mg IVPUSH ASDIRECTED PRN


   PRN Reason: Heart


Clonazepam (Klonopin)  0.5 mg PO BEDTIME [facility]


   Last Admin: 04/13/18 20:54 Dose:  0.5 mg


Epinephrine HCl (Epinephrine 1:10,000)  1 mg IVPUSH ASDIRECTED PRN


   PRN Reason: Heart


Guaifenesin (Mucinex)  600 mg PO BID [facility]


   Last Admin: 04/14/18 08:50 Dose:  600 mg


Hydrocortisone (Hydrocortisone 1% Crm)  0 gm TOP Q6H PRN


   PRN Reason: Other


Vancomycin HCl 1 gm/ Sodium (Chloride)  270 mls @ 162 mls/hr IV Q12H [facility]


   Last Admin: 04/14/18 08:28 Dose:  162 mls/hr


Piperacillin/Tazobactam/ (Dextrose 3.375 gm/ Premix)  50 mls @ 100 mls/hr IV 

Q6H [facility]


   Last Admin: 04/14/18 06:44 Dose:  100 mls/hr


Dextrose/Sodium Chloride (Dextrose 5%-1/2 Ns)  1,000 mls @ 60 mls/hr IV 

ASDIRECTED [facility]


   Last Admin: 04/14/18 06:43 Dose:  60 mls/hr


Lactobacillus Acidophilus/Rhamnosus (Multi-Kathryn Plus)  1 cap PO DAILY [facility]


Levothyroxine Sodium (Synthroid)  50 mcg PO BEDTIME [facility]


   Last Admin: 04/13/18 20:54 Dose:  50 mcg


Lidocaine HCl (Xylocaine 2%)  0 mg IVPUSH ASDIRECTED PRN


   PRN Reason: Heart


Metoprolol Succinate (Toprol Xl)  50 mg PO DAILY [facility]


   Last Admin: 04/14/18 08:53 Dose:  50 mg


Nitroglycerin (Nitrostat)  0.4 mg SL ASDIRECTED PRN


   PRN Reason: Heart


Prednisone (Prednisone)  30 mg PO DAILY [facility]; Taper


   Stop: 04/17/18 08:59


   Last Admin: 04/14/18 08:51 Dose:  30 mg


Sodium Chloride (Syrex Flush)  5 ml FLUSH Q8HR PRN


   PRN Reason: Keep Vein Open


Vancomycin HCl (Pharmacy To Dose - Vancomycin)  1 dose .XX ASDIRECTED [facility]


Warfarin Sodium (Coumadin)  5 mg PO SUTUWETHSA@2100 [facility]


   Last Admin: 04/12/18 21:22 Dose:  5 mg





Discontinued Medications





Acetaminophen (Tylenol Extra Strength)  1,000 mg PO ASDIRECTED PRN


   PRN Reason: Pain


Ceftriaxone Sodium (Rocephin) Confirm Administered Dose 1 gm .ROUTE .STK-MED ONE


   Stop: 04/11/18 13:11


   Last Admin: 04/11/18 13:46 Dose:  Not Given


Ceftriaxone Sodium (Rocephin)  1 gm IVPUSH ONETIME ONE


   Stop: 04/11/18 13:15


   Last Admin: 04/11/18 13:10 Dose:  1 gm


Sodium Chloride (Normal Saline)  1,000 mls @ 70 mls/hr IV ASDIRECTED [facility]


   Last Admin: 04/11/18 18:23 Dose:  70 mls/hr


Azithromycin 500 mg/ Sodium (Chloride)  250 mls @ 250 mls/hr IV Q24H [facility]


   Last Admin: 04/12/18 18:14 Dose:  250 mls/hr


Sodium Chloride (Normal Saline)  600 mls @ 200 mls/hr IV .BOLUS ONE


   Stop: 04/11/18 21:50


   Last Admin: 04/11/18 19:47 Dose:  200 mls/hr


Sodium Chloride (Normal Saline)  250 mls @ 250 mls/hr IV ASDIRECTED [facility]


Non-Formulary Medication (Ipratropium [Atrovent 0.03% Nasal Spray])  2 spray 

NASBOTH TID [facility]


Non-Formulary Medication (Mecobalamin [B-12])  1,000 mcg SL Q48H [facility]


   Last Admin: 04/12/18 07:42 Dose:  Not Given


Prednisone (Prednisone)  60 mg PO DAILY [facility]


   Stop: 04/13/18 23:59


   Last Admin: 04/12/18 12:24 Dose:  Not Given


Prednisone (Prednisone)  60 mg PO DAILY [facility]


   Stop: 04/13/18 23:59


   Last Admin: 04/13/18 11:47 Dose:  Not Given


Prednisone (Prednisone)  40 mg PO DAILY [facility]; Taper


   Stop: 04/17/18 09:59


Prednisone (Prednisone)  40 mg PO DAILY [facility]; Taper


   Stop: 04/18/18 08:59


   Last Admin: 04/13/18 10:54 Dose:  40 mg


Warfarin Sodium (Coumadin)  2.5 mg PO MOFR@2100 [facility]











- Exam


Physical Findings Comments:: 


GENERAL: Well-appearing elderly white male sitting in bedside chair in no acute 

distress.


HEENT: Normocephalic, atraumatic.  Conjunctiva clear.  Nares patent without 

discharge.  Mucous membranes moist, posterior pharynx unremarkable.


NECK: Supple, no masses.


CV: Irregular, no murmurs, rubs, or gallops.  2+ radial pulses.


PULMONARY: Normal effort, faint rhonchi in right base and otherwise clear, no 

wheezes.


ABDOMEN: Positive bowel sounds, soft, nontender, nondistended.


EXTREMITIES: No edema, cyanosis, or clubbing.


MUSCULOSKELETAL: Moves all extremities well.


NEUROLOGICAL: No obvious deficits.


DERMATOLOGIC: No rashes or suspicious lesions in exposed areas.


PSYCHIATRIC: Alert, interactive, appropriate affect.





- Problem List Review


Problem List Initiated/Reviewed/Updated: Yes





- My Orders


Last 24 Hours: 


My Active Orders





04/14/18 11:30


Hydrocortisone [Hydrocortisone 1% Crm]   See Dose Instructions  TOP Q6H PRN 





04/14/18 11:45


B.Bif/B.Long/L.Acidoph/L.Rhamn [Multi-Kathryn Plus]   1 cap PO DAILY 





04/15/18 05:11


CBC WITH AUTO DIFF [HEME] AM 


COMPREHENSIVE METABOLIC PN,CMP [CHEM] AM 


CRP [C-REACTIVE PROTEIN] [CHEM] AM 


INR,PT,PROTHROMBIN TIME [COAG] AM 














- Plan


Plan:: 


82yoM with history of recent admission for bilateral lower lobe pneumonia who 

was evaluated on the day of admission at [facility] for shortness 

of breath and subsequently sent to the [facility] ED for further evaluation and 

work-up due to limited availability of diagnostics at outside clinic. He was 

noted to be tachycardic, hypotensive, and with increased work of breathing with 

diagnostics showing WBC 30 and CXR with RLL consolidation. He was admitted for 

pneumonia and associated sepsis. During initial day of hospitalization, he 

required close monitoring and intervention with aggressive IVF rehydration, IV 

antibiotic therapy, and glucocorticoid initiation. Sepsis has since resolved 

and he has continued to make clinical progress in respiratory status, 

leukocytosis, and electrolyte abnormalities. 


 


# Severe sepsis, resolved 


# Lactic acidosis, resolved


# Hypotension, resolved


# Acute hypoxic respiratory failure, resolved


# Pneumonia, RLL, health care associated given recent hospitalization


# Hypernatremia


# Transaminitis


# Protein calorie malnutrition


# Supratherapeutic INR


# Hx hemorrhoids with current flare per patient





Excellent ongoing clinical and laboratory improvement. Blood cultures with no 

growth x2 days. No sputum culture has been obtained so likely will not be able 

to identify culprit organism definitively. No history of or risks for aspiration

, but certainly a possibility given location. Confirmed prior pneumococcal 

vaccination with 13 and 23 valent vaccines. 


- Continue with Zosyn and vancomycin; if continued clinical improvement, will 

plan to transition to oral therapy tomorrow 


- Continue prednisone burst, guaifenesin, albuterol prn


- Hold warfarin today


- Start probiotic


- Start hydrocortisone cream to hemorrhoids prn


- Stop IVF


- Encourage protein-rich diet


- Recheck labs tomorrow





Chronic, stable medical conditions:


# Atrial fibrillation: Metoprolol restarted. Anticoagulation with warfarin, as 

above.


# CHF: Fluid status now neutral. 


# CKD, stage 3: At baseline Cr of 1.2.


# Hypothyroidism: Continue levothyroxine. 


# RLS: Stable.


# Gout: No current evidence of flare.


# Insomnia: Continue clonazepam; consider decrease and discontinuation of this 

in the future given age and risks.





Hospitalization details:


# FEN: Stop IVF. Electrolytes improving with mild hypernatremia; recheck 

tomorrow. Regular diet. 


# PPX: Supratherapeutic INR, so no further DVT ppx needed.


# Code status: FULL.


# Disposition: Continue on inpatient status. Anticipate possible discharge to 

home tomorrow pending ongoing clinical improvement.

## 2018-04-15 VITALS — DIASTOLIC BLOOD PRESSURE: 73 MMHG | SYSTOLIC BLOOD PRESSURE: 106 MMHG

## 2018-04-15 LAB
CHLORIDE SERPL-SCNC: 109 MMOL/L (ref 98–115)
SODIUM SERPL-SCNC: 142 MMOL/L (ref 136–145)

## 2018-04-15 RX ADMIN — TAZOBACTAM SODIUM AND PIPERACILLIN SODIUM SCH MLS/HR: 375; 3 INJECTION, SOLUTION INTRAVENOUS at 06:41

## 2018-04-15 RX ADMIN — TAZOBACTAM SODIUM AND PIPERACILLIN SODIUM SCH MLS/HR: 375; 3 INJECTION, SOLUTION INTRAVENOUS at 01:06

## 2018-04-15 NOTE — [id number]
**Discharge Summary





- Hospital Course


Free Text/Narrative:: 


Date of admission: 4/11/18





Date of discharge: 5/15/18





Admission diagnoses:


1. Severe sepsis


2. Lactic acidosis


3. Hypotension


4. Acute hypoxic respiratory failure


5. Pneumonia, RLL, health care associated


6. Hypernatremia


7. Protein calorie malnutrition





Discharge diagnoses:


1. Severe sepsis, resolved 


2. Lactic acidosis, resolved


3. Hypotension, resolved


4. Acute hypoxic respiratory failure, resolved


5. Pneumonia, RLL, health care associated


6. Hypernatremia


7. Protein calorie malnutrition


8. Transaminitis


9. Supratherapeutic INR





Consultations:


None





Procedures:


None





Hospital course:


82yoM with history of recent admission for bilateral lower lobe pneumonia who 

was evaluated on the day of admission at [facility] for shortness 

of breath and subsequently sent to the [facility] ED for further evaluation and 

work-up due to limited availability of diagnostics at outside clinic. He was 

noted to be tachycardic, hypotensive, and with increased work of breathing with 

diagnostics showing WBC 30 and CXR with RLL consolidation. He was admitted for 

pneumonia and associated sepsis. During initial day of hospitalization, he 

required close monitoring and intervention with aggressive IVF rehydration, IV 

antibiotic therapy, and glucocorticoid initiation. Sepsis subsequently resolved 

and he continued to make clinical progress in respiratory status, leukocytosis, 

and electrolyte abnormalities. Blood cultures with no growth x4 days. No sputum 

culture was obtained so will not be able to identify culprit organism 

definitively. No history of or risks for aspiration, but certainly a 

possibility given location. Confirmed prior pneumococcal vaccination with 13 

and 23 valent vaccines. He was continued on other medications for chronic 

medical conditions with the exception of holding metoprolol during acute sepsis 

and warfarin adjusted based on supratherapeutic INR. 





He was deemed ready for discharge back to home. He was discharged on prior home 

medications in addition to Augmentin to finish antibiotic course for RLL, 

prednisone to finish 5 day burst, and Mucinex. He will follow-up with PCP 

GERG Bee, in 3-5 days. 





- Discharge Data


Discharge Date: 04/15/18


Discharge Disposition: Home, Self-Care 01


Condition: Good





- Patient Instructions


Diet: Usual Diet as Tolerated


Notify Provider of: Fever


Other/Special Instructions: Worsening cough or shortness of breath





- Discharge Plan


Prescriptions/Med Rec: 


Amoxicillin/Clavulanate K [Augmentin 875-125 MG] 1 tab PO BID 5 Days #10 tablet


guaiFENesin [Mucinex] 600 mg PO BID #10 tab.er


Prednisone [IJD: predniSONE] 20 mg PO DAILY 2 Days #2 tablet


Home Medications: 


 Home Meds





Acetaminophen [Acetaminophen Extra Strength] 1,000 mg PO ASDIRECTED PRN 11/26/ 14 [History]


Albuterol Sulfate [Albuterol Sulfate HFA] 1 puff INH Q4H PRN 11/26/14 [History]


ClonazePAM [KlonoPIN] 0.5 mg PO BEDTIME 11/26/14 [History]


Levothyroxine [Synthroid] 50 mcg PO BEDTIME 11/26/14 [History]


Losartan [Cozaar] 25 mg PO DAILY 11/26/14 [History]


Warfarin [Coumadin] 5 mg PO SUTUWETHSA@2100 11/26/14 [History]


Cholecalciferol (Vitamin D3) [Vitamin D3] 1,000 unit PO Q48H 03/30/18 [History]


Ipratropium [Atrovent 0.03% Nasal Spray] 2 spray NASBOTH TID 03/30/18 [History]


Mecobalamin [B-12] 1,000 mcg SL Q48H 03/30/18 [History]


Metoprolol Succinate 50 mg PO DAILY 03/30/18 [History]


Warfarin [Coumadin] 2.5 mg PO MOFR@2100 03/30/18 [History]


Amoxicillin/Clavulanate K [Augmentin 875-125 MG] 1 tab PO BID 5 Days #10 tablet 

04/15/18 [Rx]


Prednisone [IJD: predniSONE] 20 mg PO DAILY 2 Days #2 tablet 04/15/18 [Rx]


guaiFENesin [Mucinex] 600 mg PO BID #10 tab.er 04/15/18 [Rx]








Referrals: 


Mylene Balbuena NP [Primary Care Provider] -  (This week)





- Discharge Summary/Plan Comment


DC Time >30 min.: Yes





- General Info


Subjective Update: 


Mr. Mccormick reports continued improvement in his breathing. Cough nearly 

resolved. Diarrhea improving. Hemorrhoids improved with use of topical 

hydrocortisone. Tolerating diet well. Voiding well. Denies fever, chills, 

shortness of breath, chest pain, abdominal pain, rectal bleeding, or new 

concerns.





- Patient Data


Vitals - Most Recent: 


 Last Vital Signs











Temp  37.2 C   04/15/18 06:14


 


Pulse  100   04/15/18 08:40


 


Resp  18   04/15/18 06:14


 


BP  106/73   04/15/18 08:40


 


Pulse Ox  97   04/15/18 07:00











Weight - Most Recent: 90.764 kg


I&O - Last 24 hours: 


 Intake & Output











 04/14/18 04/15/18 04/15/18





 22:59 06:59 14:59


 


Intake Total 710 225 


 


Output Total  800 


 


Balance 710 -575 











Lab Results - Last 24 hrs: 


 Laboratory Results - last 24 hr











  04/15/18 04/15/18 04/15/18 Range/Units





  06:45 06:45 06:45 


 


WBC  13.3 H    (5.0-10.0)  10^3/uL


 


RBC  4.80    (4.50-6.00)  10^6/uL


 


Hgb  14.3    (13.0-17.0)  g/dL


 


Hct  44.6    (40.0-52.0)  %


 


MCV  92.9 H    (82.0-92.0)  fL


 


MCH  29.8    (27.0-31.0)  pg


 


MCHC  32.1    (32.0-36.0)  g/dL


 


RDW  13.7    (11.5-14.5)  %


 


Plt Count  192    (150-300)  10^3/uL


 


MPV  8.8    (7.4-10.4)  fL


 


Neut % (Auto)  88.5 H    (50.0-70.0)  %


 


Lymph % (Auto)  6.0 L    (20.0-40.0)  %


 


Mono % (Auto)  4.9    (2.0-8.0)  %


 


Eos % (Auto)  0.3 L    (1.0-3.0)  %


 


Baso % (Auto)  0.3    (0.0-1.0)  %


 


Neut # (Auto)  11.8 H    (2.5-7.0)  10^3/uL


 


Lymph # (Auto)  0.8 L    (1.0-4.0)  10^3/uL


 


Mono # (Auto)  0.7    (0.1-0.8)  10^3/uL


 


Eos # (Auto)  0.0 L    (0.1-0.3)  10^3/uL


 


Baso # (Auto)  0.0    (0.0-0.1)  10^3/uL


 


PT   21.9 H D   (8.9-11.4)  SEC


 


INR   2.2 H   (0.9-1.1)  


 


Sodium    142  (136-145)  mmol/L


 


Potassium    4.1  (3.3-5.3)  mmol/L


 


Chloride    109  ()  mmol/L


 


Carbon Dioxide    26.3  (21.0-32.0)  mmol/L


 


BUN    20  (6-25)  mg/dL


 


Creatinine    1.24 H  (0.51-1.17)  mg/dL


 


Est Cr Clr Drug Dosing    49.06  mL/min


 


Estimated GFR (MDRD)    56  mL/min


 


Glucose    104  ()  mg/dL


 


Calcium    8.4 L  (8.7-10.3)  mg/dL


 


Total Bilirubin    0.9  (0.2-1.0)  mg/dL


 


AST    31  (15-37)  U/L


 


ALT    72  (12-78)  U/L


 


Alkaline Phosphatase    62  ()  IU/L


 


C-Reactive Protein    2.0 H  (0.0-0.9)  mg/dL


 


Total Protein    6.3 L  (6.4-8.2)  g/dL


 


Albumin    2.70 L  (3.00-4.80)  g/dL











OSMAN Results - Last 24 hrs: 


 Microbiology











 04/11/18 12:05 Aerobic Blood Culture - Preliminary





 Blood - Venous - Lab Draw    NO GROWTH AFTER 3 DAYS





 Anaerobic Blood Culture - Preliminary





    NO GROWTH AFTER 3 DAYS


 


 04/11/18 11:45 Aerobic Blood Culture - Preliminary





 Blood - Venous    NO GROWTH AFTER 3 DAYS





 Anaerobic Blood Culture - Preliminary





    NO GROWTH AFTER 3 DAYS











Med Orders - Current: 


 Current Medications





Acetaminophen (Tylenol Extra Strength)  1,000 mg PO Q8H PRN


   PRN Reason: Pain


Albuterol (Proventil Neb Soln)  2.5 mg NEB Q4HRRT PRN


   PRN Reason: Shortness of Breath


Albuterol (Ventolin Hfa)  0 gm INH Q4H PRN


   PRN Reason: Shortness of Breath


Amoxicillin/Clavulanate Potassium (Augmentin 875 Mg/125 Mg)  1 tab PO ONETIME 

ONE


   Stop: 04/15/18 11:08


Atropine Sulfate (Atropine 0.1 Mg/Ml)  0 mg IVPUSH ASDIRECTED PRN


   PRN Reason: Heart


Clonazepam (Klonopin)  0.5 mg PO BEDTIME [facility]


   Last Admin: 04/14/18 21:11 Dose:  0.5 mg


Epinephrine HCl (Epinephrine 1:10,000)  1 mg IVPUSH ASDIRECTED PRN


   PRN Reason: Heart


Guaifenesin (Mucinex)  600 mg PO BID [facility]


   Last Admin: 04/15/18 08:39 Dose:  600 mg


Hydrocortisone (Hydrocortisone 1% Crm)  0 gm TOP Q6H PRN


   PRN Reason: Other


Vancomycin HCl 1 gm/ Sodium (Chloride)  270 mls @ 162 mls/hr IV Q12H [facility]


   Last Admin: 04/15/18 07:39 Dose:  162 mls/hr


Piperacillin/Tazobactam/ (Dextrose 3.375 gm/ Premix)  50 mls @ 100 mls/hr IV 

Q6H [facility]


   Last Admin: 04/15/18 06:41 Dose:  100 mls/hr


Lactobacillus Acidophilus/Rhamnosus (Multi-Kathryn Plus)  1 cap PO DAILY [facility]


   Last Admin: 04/15/18 08:40 Dose:  1 cap


Levothyroxine Sodium (Synthroid)  50 mcg PO BEDTIME [facility]


   Last Admin: 04/14/18 21:11 Dose:  50 mcg


Lidocaine HCl (Xylocaine 2%)  0 mg IVPUSH ASDIRECTED PRN


   PRN Reason: Heart


Metoprolol Succinate (Toprol Xl)  50 mg PO DAILY [facility]


   Last Admin: 04/15/18 08:40 Dose:  50 mg


Nitroglycerin (Nitrostat)  0.4 mg SL ASDIRECTED PRN


   PRN Reason: Heart


Prednisone (Prednisone)  20 mg PO DAILY [facility]; Taper


   Stop: 04/17/18 08:59


   Last Admin: 04/15/18 08:40 Dose:  20 mg


Sodium Chloride (Syrex Flush)  5 ml FLUSH Q8HR PRN


   PRN Reason: Keep Vein Open


Vancomycin HCl (Pharmacy To Dose - Vancomycin)  1 dose .XX ASDIRECTED [facility]


Warfarin Sodium (Coumadin)  5 mg PO SUTUWETHSA@2100 [facility]


   Last Admin: 04/12/18 21:22 Dose:  5 mg





Discontinued Medications





Acetaminophen (Tylenol Extra Strength)  1,000 mg PO ASDIRECTED PRN


   PRN Reason: Pain


Ceftriaxone Sodium (Rocephin) Confirm Administered Dose 1 gm .ROUTE .STK-MED ONE


   Stop: 04/11/18 13:11


   Last Admin: 04/11/18 13:46 Dose:  Not Given


Ceftriaxone Sodium (Rocephin)  1 gm IVPUSH ONETIME ONE


   Stop: 04/11/18 13:15


   Last Admin: 04/11/18 13:10 Dose:  1 gm


Sodium Chloride (Normal Saline)  1,000 mls @ 70 mls/hr IV ASDIRECTED [facility]


   Last Admin: 04/11/18 18:23 Dose:  70 mls/hr


Azithromycin 500 mg/ Sodium (Chloride)  250 mls @ 250 mls/hr IV Q24H [facility]


   Last Admin: 04/12/18 18:14 Dose:  250 mls/hr


Sodium Chloride (Normal Saline)  600 mls @ 200 mls/hr IV .BOLUS ONE


   Stop: 04/11/18 21:50


   Last Admin: 04/11/18 19:47 Dose:  200 mls/hr


Sodium Chloride (Normal Saline)  250 mls @ 250 mls/hr IV ASDIRECTED [facility]


Dextrose/Sodium Chloride (Dextrose 5%-1/2 Ns)  1,000 mls @ 60 mls/hr IV 

ASDIRECTED [facility]


   Last Admin: 04/14/18 06:43 Dose:  60 mls/hr


Non-Formulary Medication (Ipratropium [Atrovent 0.03% Nasal Spray])  2 spray 

NASBOTH TID [facility]


Non-Formulary Medication (Mecobalamin [B-12])  1,000 mcg SL Q48H [facility]


   Last Admin: 04/12/18 07:42 Dose:  Not Given


Prednisone (Prednisone)  60 mg PO DAILY [facility]


   Stop: 04/13/18 23:59


   Last Admin: 04/12/18 12:24 Dose:  Not Given


Prednisone (Prednisone)  60 mg PO DAILY [facility]


   Stop: 04/13/18 23:59


   Last Admin: 04/13/18 11:47 Dose:  Not Given


Prednisone (Prednisone)  40 mg PO DAILY [facility]; Taper


   Stop: 04/17/18 09:59


Prednisone (Prednisone)  40 mg PO DAILY [facility]; Taper


   Stop: 04/18/18 08:59


   Last Admin: 04/13/18 10:54 Dose:  40 mg


Warfarin Sodium (Coumadin)  2.5 mg PO MOFR@2100 [facility]











- Exam


Physical Findings Comments:: 


GENERAL: Well-appearing elderly white male sitting in bedside chair in no acute 

distress.


HEENT: Normocephalic, atraumatic.  Conjunctiva clear.  Nares patent without 

discharge.  Mucous membranes moist, posterior pharynx unremarkable.


NECK: Supple, no masses.


CV: Irregular, no murmurs, rubs, or gallops.  2+ radial pulses.


PULMONARY: Normal effort, clear to auscultation bilaterally, no wheezes, rales, 

or rhonchi.


ABDOMEN: Positive bowel sounds, soft, nontender, nondistended.


EXTREMITIES: No edema, cyanosis, or clubbing.


MUSCULOSKELETAL: Moves all extremities well.


NEUROLOGICAL: No obvious deficits.


DERMATOLOGIC: No rashes or suspicious lesions in exposed areas.


PSYCHIATRIC: Alert, interactive, appropriate affect.

## 2019-03-13 ENCOUNTER — HOSPITAL ENCOUNTER (INPATIENT)
Dept: HOSPITAL 77 - KA.MS | Age: 84
LOS: 3 days | Discharge: NURSING, REHAB OR LONG-TERM CARE | DRG: 191 | End: 2019-03-16
Attending: FAMILY MEDICINE | Admitting: NURSE PRACTITIONER
Payer: MEDICARE

## 2019-03-13 DIAGNOSIS — Z79.899: ICD-10-CM

## 2019-03-13 DIAGNOSIS — E03.9: ICD-10-CM

## 2019-03-13 DIAGNOSIS — K21.9: ICD-10-CM

## 2019-03-13 DIAGNOSIS — M10.9: ICD-10-CM

## 2019-03-13 DIAGNOSIS — I48.2: ICD-10-CM

## 2019-03-13 DIAGNOSIS — N18.3: ICD-10-CM

## 2019-03-13 DIAGNOSIS — Z90.49: ICD-10-CM

## 2019-03-13 DIAGNOSIS — J45.20: ICD-10-CM

## 2019-03-13 DIAGNOSIS — J44.1: Primary | ICD-10-CM

## 2019-03-13 DIAGNOSIS — I13.0: ICD-10-CM

## 2019-03-13 DIAGNOSIS — E78.5: ICD-10-CM

## 2019-03-13 DIAGNOSIS — Z88.8: ICD-10-CM

## 2019-03-13 DIAGNOSIS — G25.81: ICD-10-CM

## 2019-03-13 DIAGNOSIS — G47.00: ICD-10-CM

## 2019-03-13 DIAGNOSIS — I50.22: ICD-10-CM

## 2019-03-13 DIAGNOSIS — Z79.01: ICD-10-CM

## 2019-03-13 LAB
ANION GAP SERPL CALC-SCNC: 14.9 MMOL/L (ref 5–15)
BASE EXCESS BLDA CALC-SCNC: -2 MMOL/L (ref -2–3)
CHLORIDE SERPL-SCNC: 109 MMOL/L (ref 98–115)
HCO3 BLDA-SCNC: 21.6 MMOL/L (ref 22–26)
INHALED O2 MECHANISM DOSE: (no result)
PCO2 BLDA: 31 MMHG (ref 35–45)
PO2 BLDA: 57 MMHG (ref 80–105)
SAO2 % BLDA: 91 % (ref 95–98)
SODIUM SERPL-SCNC: 144 MMOL/L (ref 136–145)

## 2019-03-14 RX ADMIN — DILTIAZEM HYDROCHLORIDE SCH MG: 60 TABLET, FILM COATED ORAL at 15:08

## 2019-03-14 RX ADMIN — DILTIAZEM HYDROCHLORIDE SCH MG: 60 TABLET, FILM COATED ORAL at 21:36

## 2019-03-14 RX ADMIN — Medication PRN ML: at 17:19

## 2019-03-14 RX ADMIN — Medication PRN ML: at 09:35

## 2019-03-14 RX ADMIN — VITAMIN D, TAB 1000IU (100/BT) SCH UNITS: 25 TAB at 09:34

## 2019-03-14 NOTE — PCM.PN
- General Info


Date of Service: 03/14/19





- Patient Data


Vitals - Most Recent: 


 Last Vital Signs











Temp  98.1 F   03/14/19 11:00


 


Pulse  128 H  03/14/19 11:11


 


Resp  16   03/14/19 11:00


 


BP  102/68   03/14/19 11:00


 


Pulse Ox  96   03/14/19 11:11











Weight - Most Recent: 192 lb 1.6 oz


I&O - Last 24 Hours: 


 Intake & Output











 03/13/19 03/14/19 03/14/19





 22:59 06:59 14:59


 


Intake Total 200 100 


 


Output Total  500 


 


Balance 200 -400 











Lab Results Last 24 Hours: 


 Laboratory Results - last 24 hr











  03/13/19 03/13/19 03/13/19 Range/Units





  17:10 17:10 17:10 


 


WBC  11.14 H    (5.00-10.00)  10^3/uL


 


RBC  5.14    (4.50-6.00)  10^6/uL


 


Hgb  16.3    (13.0-17.0)  g/dL


 


Hct  47.3    (40.0-52.0)  %


 


MCV  92.0    (82.0-92.0)  fL


 


MCH  31.7 H    (27.0-31.0)  pg


 


MCHC  34.5    (32.0-36.0)  g/dL


 


RDW  14.2    (11.5-14.5)  %


 


Plt Count  234    (150-400)  10^3/uL


 


MPV  10.9 H    (7.4-10.4)  fL


 


Immature Gran % (Auto)  1.1    (0.0-5.0)  %


 


Neut % (Auto)  79.7 H    (50.0-70.0)  %


 


Lymph % (Auto)  7.1 L    (20.0-40.0)  %


 


Mono % (Auto)  9.1 H    (2.0-8.0)  %


 


Eos % (Auto)  2.5    (1.0-3.0)  %


 


Baso % (Auto)  0.5    (0.0-1.0)  %


 


Immature Gran # (Auto)  0.12    (0.00-0.50)  10^3/uL


 


Neut # (Auto)  8.88 H    (2.50-7.00)  10^3/uL


 


Lymph # (Auto)  0.79 L    (1.00-4.00)  10^3/uL


 


Mono # (Auto)  1.01 H    (0.10-0.80)  10^3/uL


 


Eos # (Auto)  0.28    (0.10-0.30)  10^3/uL


 


Baso # (Auto)  0.06    (0.00-0.10)  10^3/uL


 


PT    21.7 H  (8.9-11.4)  SEC


 


INR    2.2 H  (0.9-1.1)  


 


ABG pH     (7.35-7.45)  


 


ABG pCO2     (35-45)  mmHG


 


ABG pO2     ()  mmHG


 


ABG HCO3     (22-26)  mmol/L


 


ABG Total CO2     (23-27)  mmol/L


 


ABG O2 Saturation     (95-98)  %


 


ABG Base Excess     (-2-3)  mmol/L


 


O2 Delivery Device     


 


Sodium   144   (136-145)  mmol/L


 


Potassium   4.1   (3.3-5.3)  mmol/L


 


Chloride   109   ()  mmol/L


 


Carbon Dioxide   24.2   (21.0-32.0)  mmol/L


 


Anion Gap   14.9   (5-15)  mmol/L


 


BUN   19   (6-25)  mg/dL


 


Creatinine   1.33 H   (0.51-1.17)  mg/dL


 


Est Cr Clr Drug Dosing   44.98   mL/min


 


Estimated GFR (MDRD)   52   mL/min


 


Glucose   88  (75 - 99) mg/dL


 


Calcium   8.8   (8.7-10.3)  mg/dL


 


Total Bilirubin   0.8   (0.2-1.0)  mg/dL


 


AST   27   (15-37)  U/L


 


ALT   28   (12-78)  U/L


 


Alkaline Phosphatase   81   ()  IU/L


 


Total Protein   6.7   (6.4-8.2)  g/dL


 


Albumin   3.29   (3.00-4.80)  g/dL














  03/13/19 Range/Units





  17:15 


 


WBC   (5.00-10.00)  10^3/uL


 


RBC   (4.50-6.00)  10^6/uL


 


Hgb   (13.0-17.0)  g/dL


 


Hct   (40.0-52.0)  %


 


MCV   (82.0-92.0)  fL


 


MCH   (27.0-31.0)  pg


 


MCHC   (32.0-36.0)  g/dL


 


RDW   (11.5-14.5)  %


 


Plt Count   (150-400)  10^3/uL


 


MPV   (7.4-10.4)  fL


 


Immature Gran % (Auto)   (0.0-5.0)  %


 


Neut % (Auto)   (50.0-70.0)  %


 


Lymph % (Auto)   (20.0-40.0)  %


 


Mono % (Auto)   (2.0-8.0)  %


 


Eos % (Auto)   (1.0-3.0)  %


 


Baso % (Auto)   (0.0-1.0)  %


 


Immature Gran # (Auto)   (0.00-0.50)  10^3/uL


 


Neut # (Auto)   (2.50-7.00)  10^3/uL


 


Lymph # (Auto)   (1.00-4.00)  10^3/uL


 


Mono # (Auto)   (0.10-0.80)  10^3/uL


 


Eos # (Auto)   (0.10-0.30)  10^3/uL


 


Baso # (Auto)   (0.00-0.10)  10^3/uL


 


PT   (8.9-11.4)  SEC


 


INR   (0.9-1.1)  


 


ABG pH  7.45  (7.35-7.45)  


 


ABG pCO2  31 L  (35-45)  mmHG


 


ABG pO2  57 L  ()  mmHG


 


ABG HCO3  21.6 L  (22-26)  mmol/L


 


ABG Total CO2  23  (23-27)  mmol/L


 


ABG O2 Saturation  91 L  (95-98)  %


 


ABG Base Excess  -2  (-2-3)  mmol/L


 


O2 Delivery Device  Room air  


 


Sodium   (136-145)  mmol/L


 


Potassium   (3.3-5.3)  mmol/L


 


Chloride   ()  mmol/L


 


Carbon Dioxide   (21.0-32.0)  mmol/L


 


Anion Gap   (5-15)  mmol/L


 


BUN   (6-25)  mg/dL


 


Creatinine   (0.51-1.17)  mg/dL


 


Est Cr Clr Drug Dosing   mL/min


 


Estimated GFR (MDRD)   mL/min


 


Glucose  (75 - 99) mg/dL


 


Calcium   (8.7-10.3)  mg/dL


 


Total Bilirubin   (0.2-1.0)  mg/dL


 


AST   (15-37)  U/L


 


ALT   (12-78)  U/L


 


Alkaline Phosphatase   ()  IU/L


 


Total Protein   (6.4-8.2)  g/dL


 


Albumin   (3.00-4.80)  g/dL











Med Orders - Current: 


 Current Medications





Acetaminophen (Tylenol Extra Strength)  1,000 mg PO Q8H PRN


   PRN Reason: Pain


   Last Admin: 03/14/19 06:24 Dose:  1,000 mg


Albuterol (Ventolin Hfa)  0 gm INH Q4H PRN


   PRN Reason: Shortness of Breath


Albuterol/Ipratropium (Duoneb 3.0-0.5 Mg/3 Ml)  3 ml NEB Q6HRRT [facility]


   Last Admin: 03/14/19 11:11 Dose:  3 ml


Allopurinol (Zyloprim)  100 mg PO DAILY [facility]


   Last Admin: 03/14/19 09:30 Dose:  100 mg


Cholecalciferol (Vitamin D3)  1,000 units PO Q2D [facility]


   Last Admin: 03/14/19 09:34 Dose:  1,000 units


Clonazepam (Klonopin)  0.5 mg PO BEDTIME [facility]


Levothyroxine Sodium (Synthroid)  50 mcg PO BEDTIME [facility]


   Last Admin: 03/13/19 20:55 Dose:  50 mcg


Losartan Potassium (Cozaar)  25 mg PO DAILY [facility]


   Last Admin: 03/14/19 09:44 Dose:  25 mg


Methylprednisolone Sodium Succinate (Solu-Medrol)  40 mg IVPUSH BID [facility]


   Stop: 03/14/19 22:00


   Last Admin: 03/14/19 09:32 Dose:  40 mg


Methylprednisolone Sodium Succinate (Solu-Medrol)  40 mg IVPUSH DAILY [facility]


Metoprolol Succinate (Toprol Xl)  50 mg PO DAILY [facility]


   Last Admin: 03/14/19 09:44 Dose:  50 mg


Sodium Chloride (Saline Flush)  10 ml FLUSH Q8HR PRN


   PRN Reason: keep vein open


   Last Admin: 03/14/19 09:35 Dose:  10 ml





Discontinued Medications





Clonazepam (Klonopin)  0.5 mg PO BEDTIME [facility]


   Last Admin: 03/13/19 20:55 Dose:  0.5 mg


Methylprednisolone Sodium Succinate (Solu-Medrol)  80 mg IVPUSH ONETIME ONE


   Stop: 03/13/19 16:29


   Last Admin: 03/13/19 17:22 Dose:  80 mg


Metoprolol Tartrate (Lopressor)  25 mg PO BID [facility]


   Stop: 03/14/19 04:00


   Last Admin: 03/13/19 23:30 Dose:  25 mg











- Problem List Review


Problem List Initiated/Reviewed/Updated: Yes





- My Orders


Last 24 Hours: 


My Active Orders





03/14/19 11:49


RT Incentive Spirometry [RC] ASDIRECTED 














- Plan


Plan:: 


History summary


Mr Mccormick is a very pleasant 83-year-old gentleman was admitted from the 

[facility] by Lisa le NP he had came in for follow-up appointment for a 

recent cough and shortness of breath he had had. On March 5 he was seen in the 

clinic for shortness of breath and cough in which a chest x-ray did not 

demonstrate any acute infiltrate or nebs. He did have leukocytosis with a mild 

elevation of BNP at 180 at that time he was started on a macrolide. 2 days 

later he had resolved neutrophilia with improvement in his symptoms. He was 

again evaluated on March 11 for ongoing concerns of cough with shortness of 

breath again mild neutrophilia however chest x-ray at that time was relatively 

unchanged. He was given one time dose of prednisone 40 mg along with low dose 

levofloxacin whether he stated his breathing progressively seem to get worse 

however no mucus production. Daily his dyspnea on walking with seem to get 

worse with less stamina.





Code status: FULL.


_______________________________________________________________


HR improved with starting CCB








Primary hospital problems


Atrial fibrillation, chronic with acute RVR, [id number], in the setting of 

heart failure history, Lenient rate control stategy with ambulatory exercise 

goal of 110 Max, take manual pulses, needs sustained improved rate control as 

likely recent history details of shortness of breath, chest pain, decrease 

exercise tolerance in the absence of wheezing points to cardiolgy etiology with 

increased atrial fibrillation RVR burden.  Continue with metoprolol succinate 

however added CCB due to patient's asthma history.  Anticoagulation, INR 2.2, 

Telemetry today while ambulating around floor today to assess AV burden.  May 

need ZIO patch or ILR and cardiology assessment outpatient, change to Cardizem 

CD





HFpEF, Chronic, systolic, ECHO done in 2018, low normal LV sys function at 55%. 

Mild PAH, fluid status neutral, will monitor BP in hopes of continuing ongoing 

Ace-Inhibition for eccentric remodeling--Ideal target of ARB therapy would be 

30 mg. 








Chronic problems


Asthma, BBOO PRN,  


Hypertension, BB and ARB, 


Hypothyroidism, thyroid replacement therapy


CKD stage 3- Recent GFR 53 with a creatinine of 1.33


RLS: Stable 


Gout,  No recent flare ups


Insomnia- on Klonopin at HS








Disposition, continue inpatient acute stay, ambulate halls today to assess AV 

burden, changed Cardizem to Cardizem CD, likely could benefit from LABA due to 

history of asthma and likely COPD component--Will place on LABA/ICS.

## 2019-03-15 LAB
ANION GAP SERPL CALC-SCNC: 18 MMOL/L (ref 5–15)
CHLORIDE SERPL-SCNC: 106 MMOL/L (ref 98–115)
SODIUM SERPL-SCNC: 143 MMOL/L (ref 136–145)

## 2019-03-15 RX ADMIN — DILTIAZEM HYDROCHLORIDE SCH MG: 60 TABLET, FILM COATED ORAL at 05:38

## 2019-03-15 RX ADMIN — FLUTICASONE PROPIONATE AND SALMETEROL SCH PUFF: 50; 100 POWDER RESPIRATORY (INHALATION) at 20:40

## 2019-03-15 RX ADMIN — DILTIAZEM HYDROCHLORIDE SCH MG: 120 CAPSULE, COATED, EXTENDED RELEASE ORAL at 13:05

## 2019-03-15 RX ADMIN — FLUTICASONE PROPIONATE AND SALMETEROL SCH PUFF: 50; 100 POWDER RESPIRATORY (INHALATION) at 11:50

## 2019-03-16 ENCOUNTER — HOSPITAL ENCOUNTER (INPATIENT)
Dept: HOSPITAL 77 - KA.MS | Age: 84
LOS: 3 days | Discharge: HOME | DRG: 202 | End: 2019-03-19
Attending: FAMILY MEDICINE | Admitting: NURSE PRACTITIONER
Payer: MEDICARE

## 2019-03-16 VITALS — DIASTOLIC BLOOD PRESSURE: 86 MMHG | SYSTOLIC BLOOD PRESSURE: 124 MMHG

## 2019-03-16 DIAGNOSIS — Z87.442: ICD-10-CM

## 2019-03-16 DIAGNOSIS — J44.9: ICD-10-CM

## 2019-03-16 DIAGNOSIS — I48.2: ICD-10-CM

## 2019-03-16 DIAGNOSIS — G47.00: ICD-10-CM

## 2019-03-16 DIAGNOSIS — M10.9: ICD-10-CM

## 2019-03-16 DIAGNOSIS — Z79.01: ICD-10-CM

## 2019-03-16 DIAGNOSIS — K21.9: ICD-10-CM

## 2019-03-16 DIAGNOSIS — N18.3: ICD-10-CM

## 2019-03-16 DIAGNOSIS — Z98.49: ICD-10-CM

## 2019-03-16 DIAGNOSIS — K52.9: ICD-10-CM

## 2019-03-16 DIAGNOSIS — E03.9: ICD-10-CM

## 2019-03-16 DIAGNOSIS — M19.91: ICD-10-CM

## 2019-03-16 DIAGNOSIS — G25.81: ICD-10-CM

## 2019-03-16 DIAGNOSIS — E78.5: ICD-10-CM

## 2019-03-16 DIAGNOSIS — G89.29: ICD-10-CM

## 2019-03-16 DIAGNOSIS — Z90.49: ICD-10-CM

## 2019-03-16 DIAGNOSIS — E53.8: ICD-10-CM

## 2019-03-16 DIAGNOSIS — J45.21: Primary | ICD-10-CM

## 2019-03-16 DIAGNOSIS — Z88.8: ICD-10-CM

## 2019-03-16 DIAGNOSIS — I13.0: ICD-10-CM

## 2019-03-16 DIAGNOSIS — I50.22: ICD-10-CM

## 2019-03-16 DIAGNOSIS — G47.30: ICD-10-CM

## 2019-03-16 RX ADMIN — Medication PRN ML: at 09:40

## 2019-03-16 RX ADMIN — FLUTICASONE PROPIONATE AND SALMETEROL SCH PUFF: 50; 100 POWDER RESPIRATORY (INHALATION) at 20:54

## 2019-03-16 RX ADMIN — FLUTICASONE PROPIONATE AND SALMETEROL SCH PUFF: 50; 100 POWDER RESPIRATORY (INHALATION) at 09:42

## 2019-03-16 RX ADMIN — DILTIAZEM HYDROCHLORIDE SCH: 120 CAPSULE, COATED, EXTENDED RELEASE ORAL at 15:45

## 2019-03-16 RX ADMIN — VITAMIN D, TAB 1000IU (100/BT) SCH UNITS: 25 TAB at 09:46

## 2019-03-16 RX ADMIN — Medication PRN ML: at 11:39

## 2019-03-16 RX ADMIN — DILTIAZEM HYDROCHLORIDE SCH MG: 120 CAPSULE, COATED, EXTENDED RELEASE ORAL at 09:41

## 2019-03-16 NOTE — PCM.DCSUM1
**Discharge Summary





- Discharge Data


Discharge Date: 03/16/19


Discharge Disposition: Home, Self-Care 01


Condition: Good





- Patient Summary/Data


Consults: 


 Consultations





03/16/19 13:33


Consult to Physical Therapy [PT Evaluation and Treatment] [CONS] Routine 














- Discharge Plan


Home Medications: 


 Home Meds





Acetaminophen [Acetaminophen Extra Strength] 1,000 mg PO Q8H PRN 11/26/14 [

History]


Albuterol Sulfate [Albuterol Sulfate HFA] 1 puff INH Q4H PRN 11/26/14 [History]


ClonazePAM [KlonoPIN] 0.5 mg PO BEDTIME 11/26/14 [History]


Levothyroxine [Synthroid] 50 mcg PO BEDTIME 11/26/14 [History]


Losartan [Cozaar] 25 mg PO DAILY 11/26/14 [History]


Warfarin [Coumadin] 5 mg PO SUTUWETHSA@2100 11/26/14 [History]


Cholecalciferol (Vitamin D3) [Vitamin D3] 1,000 unit PO Q48H 03/30/18 [History]


Mecobalamin [B-12] 1,000 mcg SL Q48H 03/30/18 [History]


Metoprolol Succinate 50 mg PO DAILY 03/30/18 [History]


Warfarin [Coumadin] 2.5 mg PO MOFR@2100 03/30/18 [History]


Allopurinol [Zyloprim] 100 mg PO DAILY 03/13/19 [History]











- Patient Data


Med Orders - Current: 


 Current Medications





Acetaminophen (Tylenol Extra Strength)  1,000 mg PO Q8H PRN


   PRN Reason: Pain


Albuterol (Ventolin Hfa)  0 gm INH Q4H PRN


   PRN Reason: Shortness of Breath


Albuterol/Ipratropium (Duoneb 3.0-0.5 Mg/3 Ml)  3 ml NEB Q6HRRT PRN


   PRN Reason: sob/wheezing


Allopurinol (Zyloprim)  100 mg PO DAILY [facility]


Cholecalciferol (Vitamin D3)  1,000 units PO Q2D [facility]


Clonazepam (Klonopin)  0.5 mg PO BEDTIME [facility]


Diltiazem HCl (Cardizem Cd)  120 mg PO DAILY [facility]


Levothyroxine Sodium (Synthroid)  50 mcg PO BEDTIME [facility]


Losartan Potassium (Cozaar)  25 mg PO DAILY [facility]


Metoprolol Succinate (Toprol Xl)  50 mg PO DAILY [facility]


Nitroglycerin (Nitrostat)  0.4 mg SL ASDIRECTED PRN


   PRN Reason: Heart


Fluticasone/Salmeterol (Advair Diskus 100-50)  1 puff INH BID [facility]


Warfarin Sodium (Coumadin)  2.5 mg PO MOFR@2100 [facility]


Warfarin Sodium (Coumadin)  5 mg PO SUTUWETHSA@2100 [facility]

## 2019-03-16 NOTE — PCM.DCSUM1
**Discharge Summary





- Hospital Course


Brief History: This is an 83 year old male who was admitted from the [facility] for concerns of worsening shortness of breath. He was initially seen on 3

/5/19 for cough and SOB with leukocystosis and negative chest x-ray for which 

he was given azithromycin. Patient started to feel better. He was seen again on 

3/11/19 for same concerns and was found to have leukocytosis and negative chest 

x-ray. He was given prednisone and levaquin. Patient was admitted inpatient for 

further lab work-up, monitoring, and treatment of his condition when he 

presented to the [facility] clinic. WBC 11.1, INR 2.2, CMP unremarkable, pCO2 31, 

pO2 57, HCO3 21.6, pH 7.45. EKG noted chronic afib with RVR.





- Discharge Data


Discharge Date: 03/16/19


Discharge Disposition: DC/Tfer W/I Hosp [facility]


Condition: Good





- Patient Summary/Data


Complications: None


Consults: 





None


Labs Pending at D/C: 





None





- Discharge Plan


*PRESCRIPTION DRUG MONITORING PROGRAM REVIEWED*: No


*COPY OF PRESCRIPTION DRUG MONITORING REPORT IN PATIENT ALY: No


Home Medications: 


 Home Meds





Acetaminophen [Acetaminophen Extra Strength] 1,000 mg PO Q8H PRN 11/26/14 [

History]


Albuterol Sulfate [Albuterol Sulfate HFA] 1 puff INH Q4H PRN 11/26/14 [History]


ClonazePAM [KlonoPIN] 0.5 mg PO BEDTIME 11/26/14 [History]


Levothyroxine [Synthroid] 50 mcg PO BEDTIME 11/26/14 [History]


Losartan [Cozaar] 25 mg PO DAILY 11/26/14 [History]


Warfarin [Coumadin] 5 mg PO SUTUWETHSA@2100 11/26/14 [History]


Cholecalciferol (Vitamin D3) [Vitamin D3] 1,000 unit PO Q48H 03/30/18 [History]


Mecobalamin [B-12] 1,000 mcg SL Q48H 03/30/18 [History]


Metoprolol Succinate 50 mg PO DAILY 03/30/18 [History]


Warfarin [Coumadin] 2.5 mg PO MOFR@2100 03/30/18 [History]


Allopurinol [Zyloprim] 100 mg PO DAILY 03/13/19 [History]











- Discharge Summary/Plan Comment


DC Time >30 min.: Yes


Discharge Summary/Plan Comment: 





Date of admission: 3/13/19





Date of discharge/transfer to [facility]: 3/16/19





Admitting diagnosis: 


   Primary: Shortness of breath, Leukocytosis


   Secondary: COPD, HTN, chronic atrial fibrillation, chronic systolic heart 

failure, hypothyroidism, HLD, RLS, GERD, CKD stage III, history of gout, 

history of subarachnoid hemorrhage, insomnia





Final diagnosis: 


   Primary: Atrial fibrillation, chronic with acute RVR, RVR resolved; 

Deconditioning, Dyspnea on exertion, Asthma (mild intermittent)/COPD 

exacerbation, resolved.


   Secondary: COPD, HTN, chronic atrial fibrillation, chronic systolic heart 

failure, hypothyroidism, HLD, RLS, GERD, CKD stage III, history of gout, 

history of subarachnoid hemorrhage, insomnia





Hospital Course: 


The patient's hospital course went as expected. He was monitored with telemetry 

and achieved a resting heart rate of 80-90 and low 100s with activity after the 

addition of Cardizem. He continued on toprol XL. No signs of fluid overload 

during this stay. WBC did increase on day of transfer to 17.94, however he had 

received IV solumedrol and oral prednisone during his stay for asthma/COPD 

component. He remained afebrile. To better control his asthma/COPD he was 

started on Advair inhaler BID. 





New medications on transfer: 


-DuoNebs q6h PRN


-Diltiazem  mg po daily


-Advair 100-50 mcg po BID


-Nitroglycerin 0.4 mg SL PRN





Changes to home medications: None





Regular home medications on transfer: 


-Losartan 25 mg po daily


-Clonazepam 0.5 mg po at HS


-Toprol XL 50 mg po daily


-Levothyroxine 50 mcg po daily


-Allopurinol 100 mg po daily


-Warfarin 2.5 mg po daily M & F


-Warfarin 5 mg po daily the other 5 days of the week


- B12 1000 mcg SL q48h


-Tylenol 1000 mg po q8h PRN


-Vitamin D3 1000 units po q48h


-Albuterol 1 puff inh q4h PRN





Condition, Treatment, & Final Disposition: The patient is in stable condition 

at the time of transfer to [facility]/skilled nursing care. Patient will work 

with PT to regain his strength and for help with his dyspnea on exertion. He 

intends to go back home. 





See [facility] admit H&P for ROS and PE for 3/16/19. 





- Patient Data


Vitals - Most Recent: 


 Last Vital Signs











Temp  98.0 F   03/16/19 06:00


 


Pulse  97   03/16/19 14:00


 


Resp  20   03/16/19 06:00


 


BP  124/86   03/16/19 09:41


 


Pulse Ox  96   03/16/19 11:32











Weight - Most Recent: 192 lb 1.6 oz


I&O - Last 24 hours: 


 Intake & Output











 03/16/19 03/16/19 03/16/19





 06:59 14:59 22:59


 


Intake Total 100  


 


Output Total 600  


 


Balance -500  











Lab Results - Last 24 hrs: 


 Laboratory Results - last 24 hr











  03/16/19 Range/Units





  07:25 


 


WBC  17.94 H  (5.00-10.00)  10^3/uL


 


RBC  4.79  (4.50-6.00)  10^6/uL


 


Hgb  15.1  (13.0-17.0)  g/dL


 


Hct  43.9  (40.0-52.0)  %


 


MCV  91.6  (82.0-92.0)  fL


 


MCH  31.5 H  (27.0-31.0)  pg


 


MCHC  34.4  (32.0-36.0)  g/dL


 


RDW  14.3  (11.5-14.5)  %


 


Plt Count  225  (150-400)  10^3/uL


 


MPV  10.7 H  (7.4-10.4)  fL


 


Immature Gran % (Auto)  1.9  (0.0-5.0)  %


 


Neut % (Auto)  89.4 H  (50.0-70.0)  %


 


Lymph % (Auto)  4.0 L  (20.0-40.0)  %


 


Mono % (Auto)  4.6  (2.0-8.0)  %


 


Eos % (Auto)  0.0 L  (1.0-3.0)  %


 


Baso % (Auto)  0.1  (0.0-1.0)  %


 


Immature Gran # (Auto)  0.34  (0.00-0.50)  10^3/uL


 


Neut # (Auto)  16.06 H  (2.50-7.00)  10^3/uL


 


Lymph # (Auto)  0.71 L  (1.00-4.00)  10^3/uL


 


Mono # (Auto)  0.82 H  (0.10-0.80)  10^3/uL


 


Eos # (Auto)  0.00 L  (0.10-0.30)  10^3/uL


 


Baso # (Auto)  0.01  (0.00-0.10)  10^3/uL











Med Orders - Current: 


 Current Medications








Discontinued Medications





Acetaminophen (Tylenol Extra Strength)  1,000 mg PO Q8H PRN


   PRN Reason: Pain


   Last Admin: 03/15/19 20:45 Dose:  1,000 mg


Albuterol (Ventolin Hfa)  0 gm INH Q4H PRN


   PRN Reason: Shortness of Breath


Albuterol/Ipratropium (Duoneb 3.0-0.5 Mg/3 Ml)  3 ml NEB Q6HRRT [facility]


   Last Admin: 03/16/19 11:29 Dose:  3 ml


Allopurinol (Zyloprim)  100 mg PO DAILY [facility]


   Last Admin: 03/16/19 09:41 Dose:  100 mg


Atropine Sulfate (Atropine 0.1 Mg/Ml)  0 mg IVPUSH ASDIRECTED PRN


   PRN Reason: Heart


Cholecalciferol (Vitamin D3)  1,000 units PO Q2D [facility]


   Last Admin: 03/16/19 09:46 Dose:  1,000 units


Clonazepam (Klonopin)  0.5 mg PO BEDTIME [facility]


   Last Admin: 03/13/19 20:55 Dose:  0.5 mg


Clonazepam (Klonopin)  0.5 mg PO BEDTIME [facility]


   Last Admin: 03/15/19 20:41 Dose:  0.5 mg


Diltiazem HCl (Cardizem)  60 mg PO Q8HR [facility]


   Last Admin: 03/15/19 05:38 Dose:  60 mg


Diltiazem HCl (Cardizem Cd)  120 mg PO DAILY [facility]


   Last Admin: 03/16/19 09:41 Dose:  120 mg


Epinephrine HCl (Epinephrine 1:10,000)  1 mg IVPUSH ASDIRECTED PRN


   PRN Reason: Heart


Levothyroxine Sodium (Synthroid)  50 mcg PO BEDTIME [facility]


   Last Admin: 03/15/19 20:41 Dose:  50 mcg


Lidocaine HCl (Xylocaine 2%)  0 mg IVPUSH ASDIRECTED PRN


   PRN Reason: Heart


Losartan Potassium (Cozaar)  25 mg PO DAILY [facility]


   Last Admin: 03/16/19 09:41 Dose:  25 mg


Methylprednisolone Sodium Succinate (Solu-Medrol)  80 mg IVPUSH ONETIME ONE


   Stop: 03/13/19 16:29


   Last Admin: 03/13/19 17:22 Dose:  80 mg


Methylprednisolone Sodium Succinate (Solu-Medrol)  40 mg IVPUSH BID [facility]


   Stop: 03/14/19 22:00


   Last Admin: 03/14/19 09:32 Dose:  40 mg


Methylprednisolone Sodium Succinate (Solu-Medrol)  40 mg IVPUSH DAILY [facility]


Metoprolol Succinate (Toprol Xl)  50 mg PO DAILY [facility]


   Last Admin: 03/16/19 09:40 Dose:  50 mg


Metoprolol Succinate (Toprol Xl)  50 mg PO DAILY [facility]


Metoprolol Tartrate (Lopressor)  25 mg PO BID [facility]


   Stop: 03/14/19 04:00


   Last Admin: 03/13/19 23:30 Dose:  25 mg


Nitroglycerin (Nitrostat)  0.4 mg SL ASDIRECTED PRN


   PRN Reason: Heart


Prednisone (Prednisone)  40 mg PO WITHBREAKFAST [facility]


   Last Admin: 03/15/19 08:22 Dose:  40 mg


Fluticasone/Salmeterol (Advair Diskus 100-50)  1 puff INH BID [facility]


   Last Admin: 03/16/19 09:42 Dose:  1 puff


Sodium Chloride (Saline Flush)  10 ml FLUSH Q8HR PRN


   PRN Reason: keep vein open


   Last Admin: 03/16/19 11:39 Dose:  10 ml


Warfarin Sodium (Coumadin)  2.5 mg PO MOFR@2100 [facility]


   Last Admin: 03/15/19 20:41 Dose:  2.5 mg


Warfarin Sodium (Coumadin)  5 mg PO SUTUWETHSA@2100 [facility]


   Last Admin: 03/14/19 21:36 Dose:  5 mg

## 2019-03-16 NOTE — PCM.HP
H&P History of Present Illness





- General


Date of Service: 03/16/19


Admit Problem/Dx: 


 Admission Diagnosis/Problem





Admission Diagnosis/Problem      Shortness of breath








Source of Information: Patient, Old Records, RN


History Limitations: Reports: No Limitations





- Related Data


Allergies/Adverse Reactions: 


 Allergies











Allergy/AdvReac Type Severity Reaction Status Date / Time


 


ACE Inhibitors Allergy  THROAT Verified 03/13/19 16:20





   TICKLES  


 


lisinopril Allergy  UNKNOWN Verified 03/13/19 16:20











Home Medications: 


 Home Meds





Acetaminophen [Acetaminophen Extra Strength] 1,000 mg PO Q8H PRN 11/26/14 [

History]


Albuterol Sulfate [Albuterol Sulfate HFA] 1 puff INH Q4H PRN 11/26/14 [History]


ClonazePAM [KlonoPIN] 0.5 mg PO BEDTIME 11/26/14 [History]


Levothyroxine [Synthroid] 50 mcg PO BEDTIME 11/26/14 [History]


Losartan [Cozaar] 25 mg PO DAILY 11/26/14 [History]


Warfarin [Coumadin] 5 mg PO SUTUWETHSA@2100 11/26/14 [History]


Cholecalciferol (Vitamin D3) [Vitamin D3] 1,000 unit PO Q48H 03/30/18 [History]


Mecobalamin [B-12] 1,000 mcg SL Q48H 03/30/18 [History]


Metoprolol Succinate 50 mg PO DAILY 03/30/18 [History]


Warfarin [Coumadin] 2.5 mg PO MOFR@2100 03/30/18 [History]


Allopurinol [Zyloprim] 100 mg PO DAILY 03/13/19 [History]











Past Medical History


HEENT History: Reports: Cataract


Cardiovascular History: Reports: Afib, Heart Failure, Hypertension


Respiratory History: Reports: Pneumonia, Recurrent, Sleep Apnea, SOB


Gastrointestinal History: Reports: Chronic Diarrhea


Other Gastrointestinal History: gluten free d/t IBS


Genitourinary History: Reports: Renal Calculus


Musculoskeletal History: Reports: Arthritis, Gout


Neurological History: Reports: Headaches, Chronic, Head Trauma


Other Neuro History: blood clots in the brain,


Endocrine/Metabolic History: Reports: Hypothyroidism


Hematologic History: Reports: B12 Deficiency


Oncologic (Cancer) History: Reports: None


Other Dermatologic History: shingles in sept.





- Infectious Disease History


Infectious Disease History: Reports: Chicken Pox, Shingles





- Past Surgical History


HEENT Surgical History: Reports: Cataract Surgery


Cardiovascular Surgical History: Reports: None


Respiratory Surgical History: Reports: Thoracentesis


GI Surgical History: Reports: Cholecystectomy, Colonoscopy


Male  Surgical History: Reports: Circumcision, Renal Calculus


Endocrine Surgical History: Reports: Thyroidectomy


Musculoskeletal Surgical History: Reports: Shoulder Surgery





Social & Family History





- Family History


HEENT: Reports: None


Cardiac: Reports: None


Respiratory: Reports: None


GI: Reports: None


: Reports: Diabetic Nephropathy


Musculoskeletal: Reports: Back pain, Chronic


Neurological: Reports: None


Psychiatric: Reports: None


Endocrine/Metabolic: Reports: Diabetes, type II


Hematologic: Reports: None


Immunologic: Reports: None


Dermatologic: Reports: None


Oncologic: Reports: None





- Tobacco Use


Smoking Status *Q: Unknown Ever Smoked





- Caffeine Use


Caffeine Use: Reports: Soda





- Living Situation & Occupation


Living situation: Reports: 


Occupation: Retired





H&P Review of Systems





- Review of Systems:


Review Of Systems: See Below


General: Reports: Weakness.  Denies: Fever, Chills, Fatigue, Decreased Appetite


HEENT: Reports: Glasses.  Denies: Headaches


Pulmonary: Reports: Cough.  Denies: Shortness of Breath, Wheezing, Sputum


Cardiovascular: Reports: Dyspnea on Exertion, Edema.  Denies: Chest Pain, 

Lightheadedness


Gastrointestinal: Denies: Abdominal Pain, Constipation, Diarrhea, Decreased 

Appetite, Nausea


Genitourinary: Reports: No Symptoms


Musculoskeletal: Reports: No Symptoms


Skin: Reports: No Symptoms


Psychiatric: Reports: No Symptoms


Neurological: Denies: Dizziness, Headache





Exam





- Exam


Exam: See Below





- Vital Signs


Weight: 192 lb





- Exam


Quality Assessment: DVT Prophylaxis (On warfarin).  No: Supplemental Oxygen


General: Alert, Oriented, Cooperative, Other (No distress)


HEENT: Conjunctiva Clear, Hearing Intact, Mucosa Moist & Pink, Glasses


Neck: Supple, Trachea Midline


Lungs: Clear to Auscultation (Left lung fields and RUL), Normal Respiratory 

Effort, Crackles (RLL)


Cardiovascular: Regular Rate, Normal S1, Normal S2, Irregular Rhythm.  No: 

Systolic Murmur, Diastolic Murmur


GI/Abdominal Exam: Normal Bowel Sounds, Soft, Non-Tender, No Distention


Extremities: Other (trace-1+ edema to BLE)


Skin: Warm, Dry, Intact


Neurological: Normal Speech


Neuro Extensive - Mental Status: Alert, Oriented x3, Normal Mood/Affect, Normal 

Cognition, Memory Intact


Psychiatric: Alert, Normal Affect, Normal Mood





- Patient Data


Lab Results Last 24 hrs: 


 Laboratory Results - last 24 hr











  03/16/19 Range/Units





  14:10 


 


PT  TNP  


 


INR  2.3 H  (0.9-1.1)  











Problem List Initiated/Reviewed/Updated: Yes


Orders Last 24hrs: 


 Active Orders 24 hr











 Category Date Time Status


 


 Patient Status [ADT] Routine ADT  03/16/19 13:33 Ordered


 


 Intake and Output [RC] QSHIFT Care  03/16/19 13:33 Active


 


 Oxygen Therapy [RC] PRN Care  03/16/19 13:33 Active


 


 RT Aerosol Therapy [RC] ASDIRECTED Care  03/16/19 13:33 Active


 


 RT Incentive Spirometry [RC] ASDIRECTED Care  03/16/19 13:33 Active


 


 RT Post Treatment Assessment [RC] Click to Edit Care  03/16/19 13:33 Active


 


 RT Pre-Treatment Assessment [RC] Click to Edit Care  03/16/19 13:33 Active


 


 Up With Assistance [RC] ASDIRECTED Care  03/16/19 13:33 Active


 


 VTE/DVT Education [RC] PER UNIT ROUTINE Care  03/16/19 13:33 Active


 


 Vital Signs [RC] Q8HR Care  03/16/19 13:33 Active


 


 Consult to Physical Therapy [PT Evaluation and Cons  03/16/19 13:33 Active





 Treatment] [CONS] Routine   


 


 Heart Healthy Diet [DIET] Diet  03/16/19 Dinner Active


 


 Acetaminophen [Tylenol Extra Strength] Med  03/16/19 13:33 Active





 1,000 mg PO Q8H PRN   


 


 Albuterol [Ventolin HFA] Med  03/16/19 13:33 Active





 0 gm INH Q4H PRN   


 


 Albuterol/Ipratropium [DuoNeb 3.0-0.5 MG/3 ML] Med  03/16/19 13:33 Active





 3 ml NEB Q6H PRN   


 


 Allopurinol [Zyloprim] Med  03/17/19 09:00 Active





 100 mg PO DAILY   


 


 Cholecalciferol (Vitamin D3) [Vitamin D3] Med  03/18/19 09:00 Active





 1,000 units PO Q2D   


 


 ClonazePAM [KlonoPIN] Med  03/16/19 21:00 Active





 0.5 mg PO BEDTIME   


 


 Diltiazem [Cardizem CD] Med  03/16/19 14:30 Active





 120 mg PO DAILY   


 


 Fluticasone/Salmeterol [Advair Diskus 100-50] Med  03/16/19 21:00 Active





 0 puff INH BID   


 


 Levothyroxine [Synthroid] Med  03/16/19 21:00 Active





 50 mcg PO BEDTIME   


 


 Losartan [Cozaar] Med  03/17/19 09:00 Active





 25 mg PO DAILY   


 


 Metoprolol Succinate [Toprol XL] Med  03/17/19 09:00 Active





 50 mg PO DAILY   


 


 Nitroglycerin [Nitrostat] Med  03/16/19 13:33 Active





 0.4 mg SL ASDIRECTED PRN   


 


 Warfarin [Coumadin] Med  03/18/19 18:00 Active





 2.5 mg PO MoFr@1800   


 


 Warfarin [Coumadin] Med  03/16/19 18:00 Active





 5 mg PO SuTuWeThSa@1800   


 


 Code Status [Resuscitation Status] Routine Resus Stat  03/16/19 13:38 Ordered








 Medication Orders





Acetaminophen (Tylenol Extra Strength)  1,000 mg PO Q8H PRN


   PRN Reason: Pain


Albuterol (Ventolin Hfa)  0 gm INH Q4H PRN


   PRN Reason: Shortness of Breath


Albuterol/Ipratropium (Duoneb 3.0-0.5 Mg/3 Ml)  3 ml NEB Q6H PRN


   PRN Reason: sob/wheezing


Allopurinol (Zyloprim)  100 mg PO DAILY [facility]


Cholecalciferol (Vitamin D3)  1,000 units PO Q2D [facility]


Clonazepam (Klonopin)  0.5 mg PO BEDTIME [facility]


Diltiazem HCl (Cardizem Cd)  120 mg PO DAILY [facility]


Levothyroxine Sodium (Synthroid)  50 mcg PO BEDTIME [facility]


Losartan Potassium (Cozaar)  25 mg PO DAILY [facility]


Metoprolol Succinate (Toprol Xl)  50 mg PO DAILY [facility]


Nitroglycerin (Nitrostat)  0.4 mg SL ASDIRECTED PRN


   PRN Reason: Heart


Fluticasone/Salmeterol (Advair Diskus 100-50)  0 puff INH BID [facility]


Warfarin Sodium (Coumadin)  2.5 mg PO MoFr@1800 [facility]


Warfarin Sodium (Coumadin)  5 mg PO SuTuWeThSa@1800 [facility]








Assessment/Plan Comment:: 





HPI: This is an 83 year old male who was inpatient for chronic atrial 

fibrillation with acute RVR and COPD/asthma exacerbation from 3/13-3/16/19. 

Patient was monitored with telemetry and with the addition of Cardiazem he was 

able to maintain a resting heart rate of 80-90s and a heart rate of low 100s 

with activity. His breathing and SOB at rest had improved, however he continued 

to have dyspnea on exertion. He was transferred to [facility]/skilled nursing 

care to work with physical therapy for deconditioning. 





________________________________________________________________________________

_____________________________________________________





Primary Assessment/Plan: 





Deconditioning. Referral to PT placed. 





Dyspnea on exertion. 





Chronic atrial fibrillation. Continue Cardiazem  mg daily and toprol XL 

50 mg daily. Continue warfarin. INR 2.2 (3/13/19). Referral to pharmacy for INR 

monitoring and warfarin dosing. 





COPD with mild intermittent asthma component. Continue Advair 100-50 mcg BID, 

albuterol HFA PRN, and DuoNebs PRN. He does not require oxygen and O2 

saturation is 93% on room air. 





Secondary Assessment/Plan: 





HTN, stable. Continue losartan. 





Chronic systolic heart failure. No signs of fluid overload. ECHO (2018) EF 55%, 

low normal systolic function, mild pulmonary artery hypertension, normal left 

ventricular size, no diastolic dysfunction. 





Hypothyroidism. TSH 1.24 (July 2018). Continue levothyroxine. TSH in AM. 





HLD, history. Not on medication. Given age, likely no benefit of 10 year 

prevention. 





RLS. Continue clonazepam. 





Insomnia. 





GERD, history. 





CKD stage 3, stable. Discharge creatinine 1.26, GFR 55. 





History of gout. Continue allopurinol. 





History of SAH. 





Overall treatment plan: Patient will work with physical therapy to regain his 

strength and improve his breathing with activity so he is able to be successful 

living at home again.

## 2019-03-17 RX ADMIN — CYANOCOBALAMIN TAB 500 MCG SCH MCG: 500 TAB at 09:24

## 2019-03-17 RX ADMIN — FLUTICASONE PROPIONATE AND SALMETEROL SCH PUFF: 50; 100 POWDER RESPIRATORY (INHALATION) at 20:24

## 2019-03-17 RX ADMIN — DILTIAZEM HYDROCHLORIDE SCH MG: 120 CAPSULE, COATED, EXTENDED RELEASE ORAL at 09:24

## 2019-03-17 RX ADMIN — FLUTICASONE PROPIONATE AND SALMETEROL SCH PUFF: 50; 100 POWDER RESPIRATORY (INHALATION) at 09:24

## 2019-03-18 RX ADMIN — DILTIAZEM HYDROCHLORIDE SCH MG: 120 CAPSULE, COATED, EXTENDED RELEASE ORAL at 08:55

## 2019-03-18 RX ADMIN — FLUTICASONE PROPIONATE AND SALMETEROL SCH PUFF: 50; 100 POWDER RESPIRATORY (INHALATION) at 21:23

## 2019-03-18 RX ADMIN — FLUTICASONE PROPIONATE AND SALMETEROL SCH PUFF: 50; 100 POWDER RESPIRATORY (INHALATION) at 09:14

## 2019-03-19 VITALS — SYSTOLIC BLOOD PRESSURE: 104 MMHG | DIASTOLIC BLOOD PRESSURE: 76 MMHG

## 2019-03-19 RX ADMIN — CYANOCOBALAMIN TAB 500 MCG SCH MCG: 500 TAB at 08:28

## 2019-03-19 RX ADMIN — FLUTICASONE PROPIONATE AND SALMETEROL SCH PUFF: 50; 100 POWDER RESPIRATORY (INHALATION) at 09:06

## 2019-03-19 RX ADMIN — DILTIAZEM HYDROCHLORIDE SCH MG: 120 CAPSULE, COATED, EXTENDED RELEASE ORAL at 08:26

## 2021-12-30 ENCOUNTER — HOSPITAL ENCOUNTER (EMERGENCY)
Dept: HOSPITAL 52 - LL.ED | Age: 86
Discharge: HOME | End: 2021-12-30
Payer: MEDICARE

## 2021-12-30 VITALS — HEART RATE: 81 BPM | SYSTOLIC BLOOD PRESSURE: 139 MMHG | DIASTOLIC BLOOD PRESSURE: 96 MMHG

## 2021-12-30 DIAGNOSIS — I11.0: Primary | ICD-10-CM

## 2021-12-30 DIAGNOSIS — Z79.899: ICD-10-CM

## 2021-12-30 DIAGNOSIS — I50.9: ICD-10-CM

## 2021-12-30 DIAGNOSIS — E03.9: ICD-10-CM

## 2021-12-30 DIAGNOSIS — M10.9: ICD-10-CM

## 2021-12-30 DIAGNOSIS — I48.91: ICD-10-CM

## 2021-12-30 LAB
ANION GAP SERPL CALC-SCNC: 13 MEQ/L (ref 7–15)
CHLORIDE SERPL-SCNC: 111 MMOL/L (ref 98–107)
SODIUM SERPL-SCNC: 147 MMOL/L (ref 136–145)

## 2021-12-30 PROCEDURE — 85025 COMPLETE CBC W/AUTO DIFF WBC: CPT

## 2021-12-30 PROCEDURE — 36415 COLL VENOUS BLD VENIPUNCTURE: CPT

## 2021-12-30 PROCEDURE — 80053 COMPREHEN METABOLIC PANEL: CPT

## 2021-12-30 PROCEDURE — 84443 ASSAY THYROID STIM HORMONE: CPT

## 2021-12-30 PROCEDURE — 99283 EMERGENCY DEPT VISIT LOW MDM: CPT

## 2021-12-30 PROCEDURE — 85610 PROTHROMBIN TIME: CPT

## 2021-12-30 PROCEDURE — 93005 ELECTROCARDIOGRAM TRACING: CPT

## 2021-12-30 PROCEDURE — 84100 ASSAY OF PHOSPHORUS: CPT

## 2021-12-30 PROCEDURE — 83735 ASSAY OF MAGNESIUM: CPT

## 2021-12-30 PROCEDURE — 84484 ASSAY OF TROPONIN QUANT: CPT

## 2021-12-30 PROCEDURE — 85730 THROMBOPLASTIN TIME PARTIAL: CPT

## 2021-12-31 ENCOUNTER — HOSPITAL ENCOUNTER (EMERGENCY)
Dept: HOSPITAL 77 - KA.ED | Age: 86
Discharge: HOME | End: 2021-12-31
Payer: MEDICARE

## 2021-12-31 VITALS — HEART RATE: 70 BPM | DIASTOLIC BLOOD PRESSURE: 92 MMHG | SYSTOLIC BLOOD PRESSURE: 123 MMHG

## 2021-12-31 DIAGNOSIS — I50.9: ICD-10-CM

## 2021-12-31 DIAGNOSIS — I48.91: ICD-10-CM

## 2021-12-31 DIAGNOSIS — I11.0: Primary | ICD-10-CM

## 2021-12-31 DIAGNOSIS — E03.9: ICD-10-CM

## 2021-12-31 DIAGNOSIS — Z79.899: ICD-10-CM

## 2021-12-31 DIAGNOSIS — Z79.01: ICD-10-CM

## 2021-12-31 PROCEDURE — 96374 THER/PROPH/DIAG INJ IV PUSH: CPT

## 2021-12-31 PROCEDURE — 93005 ELECTROCARDIOGRAM TRACING: CPT

## 2021-12-31 PROCEDURE — 99283 EMERGENCY DEPT VISIT LOW MDM: CPT

## 2021-12-31 NOTE — EDM.PDOC
ED HPI GENERAL MEDICAL PROBLEM





- General


Chief Complaint: General


Stated Complaint: ELEVATED BP AND PULSE


Time Seen by Provider: 12/31/21 11:52


Source of Information: Reports: Patient, Family (dtr n law)


History Limitations: Reports: No Limitations





- History of Present Illness


INITIAL COMMENTS - FREE TEXT/NARRATIVE: 





Patient presents with high blood pressure.  Yesterday he was at the chiropractor

but couldn't get treatment due to high blood pressure.  He went to the [place] ER

and was given two oral medications that brought the pressure down and he was 

sent home.  This morning it is high again; it is the diastolic pressure that has

been most elevated.  He took his usual metoprolol and losartan this morning.  No

other problems or recent injuries.


  ** Middle Back


Pain Score (Numeric/FACES): 0





- Related Data


                                    Allergies











Allergy/AdvReac Type Severity Reaction Status Date / Time


 


No Known Drug Allergies Allergy  Cannot Verified 12/31/21 11:28





   Remember  











Home Meds: 


                                    Home Meds





Acetaminophen [Acetaminophen Extra Strength] 1,000 mg PO Q8H PRN 11/26/14 

[History]


ClonazePAM [KlonoPIN] 0.5 mg PO BEDTIME 11/26/14 [History]


Levothyroxine [Synthroid] 50 mcg PO BEDTIME 11/26/14 [History]


Losartan [Cozaar] 25 mg PO DAILY 11/26/14 [History]


Warfarin [Coumadin] 5 mg PO SUTUTHSA@2100 11/26/14 [History]


Cholecalciferol (Vitamin D3) [Vitamin D3] 1,000 unit PO Q48H 03/30/18 [History]


Mecobalamin [B-12] 1,000 mcg SL Q48H 03/30/18 [History]


Metoprolol Succinate 50 mg PO DAILY 03/30/18 [History]


Warfarin [Coumadin] 2.5 mg PO MOWEFR@2100 03/30/18 [History]


allopurinoL [Zyloprim] 100 mg PO DAILY 03/13/19 [History]


Diclofenac Sodium [Voltaren 1% Gel] 2 gm TOP BID 12/30/21 [History]


Nitroglycerin 0.4 mg SL ASDIRECTED PRN 12/30/21 [History]


polyethylene glycoL 3350 [MiraLAX] 3 tsp PO DAILY PRN 12/30/21 [History]











Past Medical History


HEENT History: Reports: Cataract, Hard of Hearing, Impaired Vision


Cardiovascular History: Reports: Afib, Heart Failure, Hypertension


Respiratory History: Reports: Pneumonia, Recurrent, Sleep Apnea, SOB


Gastrointestinal History: Reports: Chronic Diarrhea


Other Gastrointestinal History: gluten free d/t IBS


Genitourinary History: Reports: Renal Calculus


Musculoskeletal History: Reports: Arthritis, Gout


Neurological History: Reports: Headaches, Chronic, Head Trauma


Other Neuro History: blood clots in the brain,


Endocrine/Metabolic History: Reports: Hypothyroidism


Hematologic History: Reports: B12 Deficiency


Oncologic (Cancer) History: Reports: None


Other Dermatologic History: shingles in sept.





- Infectious Disease History


Infectious Disease History: Reports: Chicken Pox, Shingles





- Past Surgical History


HEENT Surgical History: Reports: Cataract Surgery


Cardiovascular Surgical History: Reports: None


Respiratory Surgical History: Reports: Thoracentesis


GI Surgical History: Reports: Cholecystectomy, Colonoscopy


Male  Surgical History: Reports: Circumcision, Renal Calculus


Endocrine Surgical History: Reports: Thyroidectomy


Musculoskeletal Surgical History: Reports: Shoulder Surgery





Social & Family History





- Family History


HEENT: Reports: None


Cardiac: Reports: None


Respiratory: Reports: None


GI: Reports: None


: Reports: Diabetic Nephropathy


Musculoskeletal: Reports: Back pain, Chronic


Neurological: Reports: None


Psychiatric: Reports: None


Endocrine/Metabolic: Reports: Diabetes, type II


Hematologic: Reports: None


Immunologic: Reports: None


Dermatologic: Reports: None


Oncologic: Reports: None





- Tobacco Use


Tobacco Use Status *Q: Never Tobacco User





- Caffeine Use


Caffeine Use: Reports: Soda, Tea





- Recreational Drug Use


Recreational Drug Use: No





- Living Situation & Occupation


Living situation: Reports: 


Occupation: Retired





ED ROS GENERAL





- Review of Systems


Review Of Systems: See Below


Constitutional: Denies: Fever, Chills, Malaise, Weakness, Fatigue


HEENT: Denies: Ear Pain, Throat Pain, Vision Change


Respiratory: Denies: Shortness of Breath, Cough


Cardiovascular: Denies: Chest Pain, Lightheadedness, Syncope


GI/Abdominal: Reports: Constipation (chronic, moderate).  Denies: Abdominal 

Pain, Diarrhea


: Denies: Dysuria, Flank Pain


Musculoskeletal: Reports: Back Pain (low, still needs chiropractor).  Denies: 

Neck Pain, Shoulder Pain, Arm Pain


Skin: Denies: Cyanosis, Jaundice, Mottled, Pallor, Diaphoresis


Neurological: Denies: Confusion, Dizziness, Headache, Seizure, Trouble Speaking


Psychiatric: Denies: Agitation, Anxiety, Confusion





ED EXAM, GENERAL





- Physical Exam


Exam: See Below


Exam Limited By: No Limitations


General Appearance: Alert, WD/WN, No Apparent Distress


Eye Exam: Bilateral Eye: EOMI, Normal Inspection, PERRL


Ears: Normal External Exam, Hearing Grossly Normal


Nose: Normal Inspection, No Blood


Throat/Mouth: Normal Inspection, Normal Lips, Normal Voice, No Airway Compromise


Head: Atraumatic, Normocephalic


Neck: Normal Inspection, Full Range of Motion


Respiratory/Chest: No Respiratory Distress, Lungs Clear, Normal Breath Sounds


Cardiovascular: Normal Peripheral Pulses, Tachycardia, Irregularly Irregular


Peripheral Pulses: 2+: Carotid (L), Carotid (R), Radial (L), Radial (R), 

Posterior Tibial (L), Posterior Tibial (R)


GI/Abdominal: Normal Bowel Sounds, Soft, Non-Tender, No Organomegaly, No 

Distention


Back Exam: Normal Inspection, Full Range of Motion.  No: CVA Tenderness (L), CVA

 Tenderness (R)


Extremities: Normal Inspection, Normal Range of Motion


Neurological: Alert, Oriented, Normal Cognition, No Motor/Sensory Deficits


Psychiatric: Normal Affect, Normal Mood


Skin Exam: Warm, Dry, Intact, Normal Color, No Rash





Course





- Vital Signs


Last Recorded V/S: 


                                Last Vital Signs











Temp  96.8 F L  12/31/21 11:10


 


Pulse  65   12/31/21 13:18


 


Resp  18   12/31/21 11:10


 


BP  125/87   12/31/21 13:18


 


Pulse Ox  95   12/31/21 13:18














- Orders/Labs/Meds


Orders: 


                               Active Orders 24 hr











 Category Date Time Status


 


 EKG 12 Lead [EK] Stat Ther  12/31/21 11:26 Ordered


 


 EKG 12 Lead [EK] Stat Ther  12/31/21 13:18 Ordered











Meds: 


Medications














Discontinued Medications














Generic Name Dose Route Start Last Admin





  Trade Name Freq  PRN Reason Stop Dose Admin


 


Diltiazem HCl  20 mg  12/31/21 12:11  12/31/21 12:29





  Diltiazem 25 Mg/5 Ml Sdv  IVPUSH  12/31/21 12:12  20 mg





  ONETIME ONE   Administration














- Re-Assessments/Exams


Free Text/Narrative Re-Assessment/Exam: 





12/31/21 12:20


EKG shows A Fib with RVR rate of 106; rate on monitor is sometime in 117-122 

range.  Patient and family aren't aware of any previous A Fib.  Will treat with 

diltiazem now and see if it helps both rate and pressure.  Labs were run 

yesterday in [place] and we are obtaining those reports.


12/31/21 12:26


CBC, CMP, INR, Troponin were all reviewed from yesterday and normal.


12/31/21 12:29


EKGs from 3/13/19 and 4/11/18 were reviewed and showed A Fib with RVR rates of 

107 and 120.  EKG yesterday in [place] shows A Fib with rate 97.


12/31/21 13:55


Diltiazem successfully lowered both HR and BP.  Second EKG shows A Fib with rate

 of 55.  HR plateaued in the 70's.  Patient was monitored for about an hour to 

make sure he was stable.  Discharged to home in stable condition after 

discussing findings and treatment plan.  Since he is taking the succinate form 

of metoprolol, I advised taking a second dose at onset of elevated HR or BP and 

then take 2 doses each morning until he sees his PCP as long as pressure and/or 

rate don't drop too low.





Departure





- Departure


Time of Disposition: 13:51


Disposition: Home, Self-Care 01


Condition: Good


Clinical Impression: 


 Elevated blood pressure reading with diagnosis of hypertension, Atrial 

fibrillation with RVR








- Discharge Information


Referrals: 


Keiko Romero DO [Primary Care Provider] - 


Forms:  ED Department Discharge


Additional Instructions: 


Continue your regular medications as directed.





If your blood pressure or heart rate go high again, take the Metoprolol 50 mg 

twice daily instead of once.  If still not controlled go to ER if clinic not 

open.





Follow up with your PCP for recheck on Monday.








Sepsis Event Note (ED)





- Evaluation


Sepsis Screening Result: No Definite Risk





- Focused Exam


Vital Signs: 


                                   Vital Signs











  Temp Pulse Resp BP Pulse Ox


 


 12/31/21 13:18   65   125/87  95


 


 12/31/21 12:50   53 L   121/76  95


 


 12/31/21 12:43   69   126/101 H  96


 


 12/31/21 12:00   110 H   150/118 H  99


 


 12/31/21 11:45   115 H   184/135 H  97


 


 12/31/21 11:21   94   146/112 H  96


 


 12/31/21 11:15   94   146/112 H  96


 


 12/31/21 11:10  96.8 F L  96  18  169/112 H  96














- My Orders


Last 24 Hours: 


My Active Orders





12/31/21 11:26


EKG 12 Lead [EK] Stat 





12/31/21 13:18


EKG 12 Lead [EK] Stat 














- Assessment/Plan


Last 24 Hours: 


My Active Orders





12/31/21 11:26


EKG 12 Lead [EK] Stat 





12/31/21 13:18


EKG 12 Lead [EK] Stat

## 2022-01-04 NOTE — EDM.PDOC
ED HPI GENERAL MEDICAL PROBLEM





- General


Chief Complaint: Back Pain or Injury


Stated Complaint: right flank pain


Time Seen by Provider: 12/30/21 15:34


Source of Information: Reports: Patient


History Limitations: Reports: No Limitations





- History of Present Illness


INITIAL COMMENTS - FREE TEXT/NARRATIVE: 





Pt. presents to ER with complaints of hypertension. He states that he was told 

to come to ER by his chiropractor, as he was noted to be hypertensive at an 

appointment today. His only complaint is of back pain which he has been 

experiencing for some time. 


Pt. denies any chest pain, shortness, of breath or lightheadedness. No fever or 

chills. No recent illness. Denies any lightheadedness or headache.


Pt. thinks he forgot to take his medication this AM.


Onset Date: 12/30/21


Location: Reports: Generalized


  ** right flank


Pain Score (Numeric/FACES): 10





- Related Data


                                    Allergies











Allergy/AdvReac Type Severity Reaction Status Date / Time


 


No Known Drug Allergies Allergy  Cannot Verified 12/31/21 11:28





   Remember  











Home Meds: 


                                    Home Meds





Acetaminophen [Acetaminophen Extra Strength] 1,000 mg PO Q8H PRN 11/26/14 

[History]


ClonazePAM [KlonoPIN] 0.5 mg PO BEDTIME 11/26/14 [History]


Levothyroxine [Synthroid] 50 mcg PO BEDTIME 11/26/14 [History]


Losartan [Cozaar] 25 mg PO DAILY 11/26/14 [History]


Warfarin [Coumadin] 5 mg PO SUTUTHSA@2100 11/26/14 [History]


Cholecalciferol (Vitamin D3) [Vitamin D3] 1,000 unit PO Q48H 03/30/18 [History]


Mecobalamin [B-12] 1,000 mcg SL Q48H 03/30/18 [History]


Metoprolol Succinate 50 mg PO DAILY 03/30/18 [History]


Warfarin [Coumadin] 2.5 mg PO MOWEFR@2100 03/30/18 [History]


allopurinoL [Zyloprim] 100 mg PO DAILY 03/13/19 [History]


Diclofenac Sodium [Voltaren 1% Gel] 2 gm TOP BID 12/30/21 [History]


Nitroglycerin 0.4 mg SL ASDIRECTED PRN 12/30/21 [History]


polyethylene glycoL 3350 [MiraLAX] 3 tsp PO DAILY PRN 12/30/21 [History]











Past Medical History


HEENT History: Reports: Cataract, Hard of Hearing, Impaired Vision


Cardiovascular History: Reports: Afib, Heart Failure, Hypertension


Respiratory History: Reports: Pneumonia, Recurrent, Sleep Apnea, SOB


Gastrointestinal History: Reports: Chronic Diarrhea


Other Gastrointestinal History: gluten free d/t IBS


Genitourinary History: Reports: Renal Calculus


Musculoskeletal History: Reports: Arthritis, Gout


Neurological History: Reports: Headaches, Chronic, Head Trauma


Other Neuro History: blood clots in the brain,


Endocrine/Metabolic History: Reports: Hypothyroidism


Hematologic History: Reports: B12 Deficiency


Oncologic (Cancer) History: Reports: None


Other Dermatologic History: shingles in sept.





- Infectious Disease History


Infectious Disease History: Reports: Chicken Pox, Shingles





- Past Surgical History


HEENT Surgical History: Reports: Cataract Surgery


Cardiovascular Surgical History: Reports: None


Respiratory Surgical History: Reports: Thoracentesis


GI Surgical History: Reports: Cholecystectomy, Colonoscopy


Male  Surgical History: Reports: Circumcision, Renal Calculus


Endocrine Surgical History: Reports: Thyroidectomy


Musculoskeletal Surgical History: Reports: Shoulder Surgery





Social & Family History





- Family History


HEENT: Reports: None


Cardiac: Reports: None


Respiratory: Reports: None


GI: Reports: None


: Reports: Diabetic Nephropathy


Musculoskeletal: Reports: Back pain, Chronic


Neurological: Reports: None


Psychiatric: Reports: None


Endocrine/Metabolic: Reports: Diabetes, type II


Hematologic: Reports: None


Immunologic: Reports: None


Dermatologic: Reports: None


Oncologic: Reports: None





- Tobacco Use


Tobacco Use Status *Q: Never Tobacco User


Second Hand Smoke Exposure: No





- Caffeine Use


Caffeine Use: Reports: None





- Recreational Drug Use


Recreational Drug Use: No





- Living Situation & Occupation


Living situation: Reports: 


Occupation: Retired





ED ROS GENERAL





- Review of Systems


Review Of Systems: See Below


Constitutional: Reports: No Symptoms


HEENT: Reports: No Symptoms


Respiratory: Reports: No Symptoms


Cardiovascular: Reports: Blood Pressure Problem


Endocrine: Reports: No Symptoms


GI/Abdominal: Reports: No Symptoms


: Reports: No Symptoms


Musculoskeletal: Reports: No Symptoms


Skin: Reports: No Symptoms


Neurological: Reports: No Symptoms


Psychiatric: Reports: No Symptoms


Hematologic/Lymphatic: Reports: No Symptoms


Immunologic: Reports: No Symptoms





ED EXAM, GENERAL





- Physical Exam


Exam: See Below


Exam Limited By: No Limitations


General Appearance: Alert, WD/WN, No Apparent Distress


Respiratory/Chest: No Respiratory Distress, Lungs Clear, Normal Breath Sounds, 

No Accessory Muscle Use, Chest Non-Tender


Cardiovascular: No Edema, No JVD, Irregularly Irregular


GI/Abdominal: Normal Bowel Sounds, Soft, Non-Tender, No Distention, No Mass


 (Male) Exam: Deferred


Back Exam: Normal Inspection, Full Range of Motion


Extremities: Normal Inspection, Normal Range of Motion


Neurological: Alert, Oriented, CN II-XII Intact, Normal Cognition, Normal Gait, 

Normal Reflexes, No Motor/Sensory Deficits


Psychiatric: Normal Affect, Normal Mood


Skin Exam: Warm, Dry, Intact, Normal Color, No Rash


Lymphatic: No Adenopathy


  ** #1 Interpretation


Rhythm: A-Fib





Course





- Vital Signs


Last Recorded V/S: 





                                Last Vital Signs











Temp  35.9 C L  12/30/21 15:34


 


Pulse  81   12/30/21 17:55


 


Resp  21 H  12/30/21 17:55


 


BP  139/96 H  12/30/21 17:55


 


Pulse Ox  97   12/30/21 17:55














- Orders/Labs/Meds


Labs: 





                                Laboratory Tests











  12/30/21 12/30/21 12/30/21 Range/Units





  16:15 16:15 16:15 


 


WBC     (4.0-10.2)  K/uL


 


RBC     (4.33-5.41)  M/uL


 


Hgb     (13.1-16.8)  g/dL


 


Hct     (39.0-49.0)  %


 


MCV     (84.0-98.0)  fL


 


MCH     (28.2-33.3)  pg


 


MCHC     (31.7-36.0)  g/dL


 


RDW     (11.2-14.1)  %


 


Plt Count     (150-350)  K/uL


 


Neut % (Auto)     (45.0-80.0)  %


 


Lymph % (Auto)     (10.0-50.0)  %


 


Mono % (Auto)     (2.0-14.0)  %


 


Eos % (Auto)     (0.0-5.0)  %


 


Baso % (Auto)     (0.0-2.0)  %


 


Neut # (Auto)     (1.40-7.00)  K/uL


 


Lymph # (Auto)     (0.50-3.50)  K/uL


 


Mono # (Auto)     (0.00-1.00)  K/uL


 


Eos # (Auto)     (0.00-0.50)  K/uL


 


Baso # (Auto)     (0.00-0.20)  K/uL


 


PT    25.2 H  (9.6-11.3)  SEC


 


INR    2.5  


 


APTT  34.0 H    (23.6-29.8)  SEC


 


Sodium     (136-145)  mmol/L


 


Potassium     (3.5-5.1)  mmol/L


 


Chloride     ()  mmol/L


 


Carbon Dioxide     (21.0-32.0)  mmol/L


 


Anion Gap     (7-15)  meq/L


 


BUN     (7-18)  mg/dL


 


Creatinine     (0.51-1.17)  mg/dL


 


Est Cr Clr Drug Dosing     mL/min


 


Estimated GFR (MDRD)     mL/min


 


Glucose     (70-99)  mg/dL


 


Calcium     (8.5-10.1)  mg/dL


 


Phosphorus   3.6   (2.6-4.7)  mg/dL


 


Magnesium   2.1   (1.8-2.4)  mg/dL


 


Total Bilirubin     (0.2-1.0)  mg/dL


 


AST     (15-37)  U/L


 


ALT     (12-78)  U/L


 


Alkaline Phosphatase     ()  IU/L


 


Troponin I High Sens   8   (<=76)  ng/L


 


Total Protein     (6.4-8.2)  g/dL


 


Albumin     (3.4-5.0)  g/dL


 


TSH, Ultra Sensitive   1.754   (0.358-3.740)  mIU/mL














  12/30/21 12/30/21 Range/Units





  16:15 16:15 


 


WBC  7.7   (4.0-10.2)  K/uL


 


RBC  5.43 H   (4.33-5.41)  M/uL


 


Hgb  16.7   (13.1-16.8)  g/dL


 


Hct  49.6 H   (39.0-49.0)  %


 


MCV  91.3   (84.0-98.0)  fL


 


MCH  30.8   (28.2-33.3)  pg


 


MCHC  33.7   (31.7-36.0)  g/dL


 


RDW  15.0 H   (11.2-14.1)  %


 


Plt Count  179   (150-350)  K/uL


 


Neut % (Auto)  74.1   (45.0-80.0)  %


 


Lymph % (Auto)  11.6   (10.0-50.0)  %


 


Mono % (Auto)  11.8   (2.0-14.0)  %


 


Eos % (Auto)  1.9   (0.0-5.0)  %


 


Baso % (Auto)  0.6   (0.0-2.0)  %


 


Neut # (Auto)  5.72   (1.40-7.00)  K/uL


 


Lymph # (Auto)  0.90   (0.50-3.50)  K/uL


 


Mono # (Auto)  0.91   (0.00-1.00)  K/uL


 


Eos # (Auto)  0.15   (0.00-0.50)  K/uL


 


Baso # (Auto)  0.05   (0.00-0.20)  K/uL


 


PT    (9.6-11.3)  SEC


 


INR    


 


APTT    (23.6-29.8)  SEC


 


Sodium   147 H  (136-145)  mmol/L


 


Potassium   4.8  (3.5-5.1)  mmol/L


 


Chloride   111 H  ()  mmol/L


 


Carbon Dioxide   27.8  (21.0-32.0)  mmol/L


 


Anion Gap   13.0  (7-15)  meq/L


 


BUN   18  (7-18)  mg/dL


 


Creatinine   1.45 H  (0.51-1.17)  mg/dL


 


Est Cr Clr Drug Dosing   36.69  mL/min


 


Estimated GFR (MDRD)   46  mL/min


 


Glucose   91  (70-99)  mg/dL


 


Calcium   8.9  (8.5-10.1)  mg/dL


 


Phosphorus    (2.6-4.7)  mg/dL


 


Magnesium    (1.8-2.4)  mg/dL


 


Total Bilirubin   0.9  (0.2-1.0)  mg/dL


 


AST   24  (15-37)  U/L


 


ALT   25  (12-78)  U/L


 


Alkaline Phosphatase   93  ()  IU/L


 


Troponin I High Sens    (<=76)  ng/L


 


Total Protein   7.0  (6.4-8.2)  g/dL


 


Albumin   3.5  (3.4-5.0)  g/dL


 


TSH, Ultra Sensitive    (0.358-3.740)  mIU/mL











Meds: 





Medications














Discontinued Medications














Generic Name Dose Route Start Last Admin





  Trade Name Freq  PRN Reason Stop Dose Admin


 


Clonidine HCl  0.1 mg  12/30/21 17:06  12/30/21 17:10





  Clonidine 0.1 Mg Tab  PO  12/30/21 17:07  0.1 mg





  ONETIME ONE   Administration


 


Metoprolol Tartrate  25 mg  12/30/21 16:11  12/30/21 16:19





  Metoprolol Tartrate 25 Mg Tab  PO  12/30/21 16:12  25 mg





  ONETIME ONE   Administration


 


Sodium Chloride  10 ml  12/30/21 16:10 





  Sodium Chloride 0.9% 10 Ml Syringe  FLUSH  





  ASDIRECTED PRN  





  Keep Vein Open  














- Re-Assessments/Exams


Free Text/Narrative Re-Assessment/Exam: 


Pt. was given metoprolol tartrate 25mg PO in ER and eventually was given 

clonidine 0.1mg PO. BP improved from approx. 180/130 on admission to 139/96 at 

time of discharge.





Departure





- Departure


Time of Disposition: 18:00


Disposition: Home, Self-Care 01


Clinical Impression: 


 Hypertension








- Discharge Information


Instructions:  Hypertension, Adult, Easy-to-Read


Referrals: 


PCP,Unknown [Primary Care Provider] - 


Forms:  ED Department Discharge


Additional Instructions: 


Continue with current medications for now.





Make sure you take them tomorrow morning.





Follow-up tomorrow at clinic in [place] to get your blood pressure rechecked. 





I will be in contact with Dr. Beyer regarding your ER visit today.





Sepsis Event Note (ED)





- Evaluation


Sepsis Screening Result: No Definite Risk





- Assessment/Plan


Plan: 





Continue with current medications for now.





Make sure you take them tomorrow morning.





Follow-up tomorrow at clinic in [place] to get your blood pressure rechecked. 





I will be in contact with Dr. Beyer regarding your ER visit today.

## 2023-05-25 ENCOUNTER — HOSPITAL ENCOUNTER (EMERGENCY)
Dept: HOSPITAL 77 - KA.ED | Age: 88
Discharge: HOME | End: 2023-05-25
Payer: MEDICARE

## 2023-05-25 VITALS — DIASTOLIC BLOOD PRESSURE: 104 MMHG | SYSTOLIC BLOOD PRESSURE: 138 MMHG | HEART RATE: 90 BPM

## 2023-05-25 DIAGNOSIS — I11.0: ICD-10-CM

## 2023-05-25 DIAGNOSIS — M10.9: ICD-10-CM

## 2023-05-25 DIAGNOSIS — E03.9: ICD-10-CM

## 2023-05-25 DIAGNOSIS — I50.9: ICD-10-CM

## 2023-05-25 DIAGNOSIS — Z79.01: ICD-10-CM

## 2023-05-25 DIAGNOSIS — Z79.899: ICD-10-CM

## 2023-05-25 DIAGNOSIS — Z88.8: ICD-10-CM

## 2023-05-25 DIAGNOSIS — I48.91: ICD-10-CM

## 2023-05-25 DIAGNOSIS — R07.9: Primary | ICD-10-CM

## 2023-05-25 LAB
ALBUMIN SERPL-MCNC: 2.86 G/DL (ref 3.4–5)
ALP SERPL-CCNC: 94 U/L (ref 46–116)
ALT SERPL-CCNC: 39 U/L (ref 14–63)
ANION GAP SERPL CALC-SCNC: 5.8 MMOL/L (ref 5–15)
APPEARANCE UR: CLEAR
AST SERPL-CCNC: 23 U/L (ref 15–37)
BACTERIA URNS QL MICRO: (no result) /HPF
BASOPHILS # BLD AUTO: 0.06 10^3/UL (ref 0–0.1)
BASOPHILS NFR BLD AUTO: 0.5 % (ref 0–1)
BILIRUB SERPL-MCNC: 0.8 MG/DL (ref 0.2–1)
BILIRUB UR STRIP-MCNC: NEGATIVE MG/DL
BNP SERPL-MCNC: 62 PG/ML (ref 0–100)
BUN SERPL-MCNC: 16 MG/DL (ref 7–18)
CALCIUM SERPL-MCNC: 8.6 MG/DL (ref 8.7–10.3)
CHLORIDE SERPL-SCNC: 106 MMOL/L (ref 98–107)
CO2 SERPL-SCNC: 33.8 MMOL/L (ref 21–32)
COLOR UR: YELLOW
CREAT CL 24H UR+SERPL-VRATE: 42.14 ML/MIN
CREAT SERPL-MCNC: 1.24 MG/DL (ref 0.51–1.17)
EGFRCR SERPLBLD CKD-EPI 2021: 57 ML/MIN (ref 60–?)
EOSINOPHIL # BLD AUTO: 0.06 10^3/UL (ref 0.1–0.3)
EOSINOPHIL NFR BLD AUTO: 0.5 % (ref 1–3)
EPI CELLS #/AREA URNS HPF: (no result) /LPF
ERYTHROCYTE [DISTWIDTH] IN BLOOD BY AUTOMATED COUNT: 14.4 % (ref 11.5–14.5)
GLUCOSE SERPL-MCNC: 93 MG/DL (ref 70–140)
GLUCOSE UR STRIP-MCNC: NEGATIVE MG/DL
HCT VFR BLD AUTO: 48.8 % (ref 40–52)
HGB BLD-MCNC: 16 G/DL (ref 13–17)
IMM GRANULOCYTES # BLD: 0.09 10^3/UL (ref 0–0.5)
IMM GRANULOCYTES NFR BLD: 0.7 % (ref 0–5)
KETONES UR STRIP-MCNC: NEGATIVE MG/DL
LYMPHOCYTES # BLD AUTO: 0.43 10^3/UL (ref 1–4)
LYMPHOCYTES NFR BLD AUTO: 3.5 % (ref 20–40)
MCH RBC QN AUTO: 30.6 PG (ref 27–31)
MCHC RBC AUTO-ENTMCNC: 32.8 G/DL (ref 32–36)
MCHC RBC AUTO-ENTMCNC: 93.3 FL (ref 82–92)
MONOCYTES # BLD AUTO: 0.63 10^3/UL (ref 0.1–0.8)
MONOCYTES NFR BLD AUTO: 5.2 % (ref 2–8)
NEUTROPHILS # BLD AUTO: 10.86 10^3/UL (ref 2.5–7)
NEUTROPHILS NFR BLD AUTO: 89.6 % (ref 50–70)
NITRITE UR QL: NEGATIVE
PH UR STRIP: 7 [PH] (ref 5–9)
PLATELET # BLD AUTO: 172 10^3/UL (ref 150–400)
PMV BLD AUTO: 10.4 FL (ref 7.4–10.4)
POTASSIUM SERPL-SCNC: 3.6 MMOL/L (ref 3.5–5.1)
PROT SERPL-MCNC: 5.9 G/DL (ref 6.4–8.2)
PROT UR STRIP-MCNC: NEGATIVE MG/DL
RBC # BLD AUTO: 5.23 10^6/UL (ref 4.5–6)
RBC # URNS HPF: (no result) /HPF (ref 0–5)
RBC UR QL: NEGATIVE
SODIUM SERPL-SCNC: 142 MMOL/L (ref 136–145)
SP GR UR STRIP: 1.01 (ref 1–1.03)
UROBILINOGEN UR STRIP-ACNC: 0.2 E.U./DL (ref 0.2–1)
WBC # BLD AUTO: 12.13 10^3/UL (ref 5–10)
WBC UR QL: (no result) /HPF (ref 0–5)

## 2025-03-11 ENCOUNTER — HOSPITAL ENCOUNTER (OUTPATIENT)
Dept: HOSPITAL 77 - KA.ED | Age: 89
Setting detail: OBSERVATION
LOS: 2 days | Discharge: HOME | End: 2025-03-13
Attending: INTERNAL MEDICINE | Admitting: INTERNAL MEDICINE
Payer: MEDICARE

## 2025-03-11 DIAGNOSIS — S01.511A: ICD-10-CM

## 2025-03-11 DIAGNOSIS — Z88.8: ICD-10-CM

## 2025-03-11 DIAGNOSIS — I11.0: ICD-10-CM

## 2025-03-11 DIAGNOSIS — Z79.01: ICD-10-CM

## 2025-03-11 DIAGNOSIS — I48.11: ICD-10-CM

## 2025-03-11 DIAGNOSIS — S01.81XA: Primary | ICD-10-CM

## 2025-03-11 DIAGNOSIS — Z79.890: ICD-10-CM
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
## 2025-03-11 DIAGNOSIS — E03.9: ICD-10-CM

## 2025-03-11 DIAGNOSIS — D72.829: ICD-10-CM

## 2025-03-11 DIAGNOSIS — I50.9: ICD-10-CM

## 2025-03-11 DIAGNOSIS — Z79.899: ICD-10-CM

## 2025-03-11 LAB
BASOPHILS # BLD AUTO: 0.03 10^3/UL (ref 0–0.1)
BASOPHILS NFR BLD AUTO: 0.3 % (ref 0–1)
EOSINOPHIL # BLD AUTO: 0.04 10^3/UL (ref 0.1–0.3)
EOSINOPHIL NFR BLD AUTO: 0.4 % (ref 1–3)
ERYTHROCYTE [DISTWIDTH] IN BLOOD BY AUTOMATED COUNT: 15.3 % (ref 11.5–14.5)
HCT VFR BLD AUTO: 46.6 % (ref 40–52)
HGB BLD-MCNC: 15.5 G/DL (ref 13–17)
IMM GRANULOCYTES # BLD: 0.03 10^3/UL (ref 0–0.04)
IMM GRANULOCYTES NFR BLD: 0.3 % (ref 0–0.4)
LYMPHOCYTES # BLD AUTO: 0.65 10^3/UL (ref 1–4)
LYMPHOCYTES NFR BLD AUTO: 5.7 % (ref 20–40)
MCH RBC QN AUTO: 30.4 PG (ref 27–31)
MCHC RBC AUTO-ENTMCNC: 33.3 G/DL (ref 32–36)
MCHC RBC AUTO-ENTMCNC: 91.4 FL (ref 82–92)
MONOCYTES # BLD AUTO: 1 10^3/UL (ref 0.1–0.8)
MONOCYTES NFR BLD AUTO: 8.8 % (ref 2–8)
NEUTROPHILS # BLD AUTO: 9.67 10^3/UL (ref 2.5–7)
NEUTROPHILS NFR BLD AUTO: 84.5 % (ref 50–70)
PLATELET # BLD AUTO: 167 10^3/UL (ref 150–400)
PMV BLD AUTO: 10.6 FL (ref 7.4–10.4)
RBC # BLD AUTO: 5.1 10^6/UL (ref 4.5–6)
WBC # BLD AUTO: 11.42 10^3/UL (ref 5–10)

## 2025-03-12 LAB
ALBUMIN SERPL-MCNC: 3.01 G/DL (ref 3.4–5)
ALP SERPL-CCNC: 82 U/L (ref 46–116)
ALT SERPL-CCNC: 43 U/L (ref 14–63)
ANION GAP SERPL CALC-SCNC: 10.9 MMOL/L (ref 5–15)
ANION GAP SERPL CALC-SCNC: 11.1 MMOL/L (ref 5–15)
AST SERPL-CCNC: 33 U/L (ref 15–37)
BASOPHILS # BLD AUTO: 0.05 10^3/UL (ref 0–0.1)
BASOPHILS NFR BLD AUTO: 0.4 % (ref 0–1)
BILIRUB SERPL-MCNC: 0.9 MG/DL (ref 0.2–1)
BNP SERPL-MCNC: 69 PG/ML (ref 0–100)
BUN SERPL-MCNC: 16 MG/DL (ref 7–18)
BUN SERPL-MCNC: 16 MG/DL (ref 7–18)
CALCIUM SERPL-MCNC: 8.1 MG/DL (ref 8.7–10.3)
CALCIUM SERPL-MCNC: 8.5 MG/DL (ref 8.7–10.3)
CHLORIDE SERPL-SCNC: 109 MMOL/L (ref 98–107)
CHLORIDE SERPL-SCNC: 109 MMOL/L (ref 98–107)
CO2 SERPL-SCNC: 29.5 MMOL/L (ref 21–32)
CO2 SERPL-SCNC: 31.4 MMOL/L (ref 21–32)
CREAT CL 24H UR+SERPL-VRATE: (no result) ML/MIN
CREAT CL 24H UR+SERPL-VRATE: 44.17 ML/MIN
CREAT SERPL-MCNC: 1.14 MG/DL (ref 0.51–1.17)
CREAT SERPL-MCNC: 1.17 MG/DL (ref 0.51–1.17)
EGFRCR SERPLBLD CKD-EPI 2021: 60 ML/MIN (ref 60–?)
EGFRCR SERPLBLD CKD-EPI 2021: 62 ML/MIN (ref 60–?)
EOSINOPHIL # BLD AUTO: 0.07 10^3/UL (ref 0.1–0.3)
EOSINOPHIL NFR BLD AUTO: 0.6 % (ref 1–3)
ERYTHROCYTE [DISTWIDTH] IN BLOOD BY AUTOMATED COUNT: 15.2 % (ref 11.5–14.5)
GLUCOSE SERPL-MCNC: 101 MG/DL (ref 70–140)
GLUCOSE SERPL-MCNC: 95 MG/DL (ref 70–140)
HCT VFR BLD AUTO: 45.9 % (ref 40–52)
HGB BLD-MCNC: 15 G/DL (ref 13–17)
IMM GRANULOCYTES # BLD: 0.02 10^3/UL (ref 0–0.04)
IMM GRANULOCYTES NFR BLD: 0.2 % (ref 0–0.4)
INR PPP: 2.3 (ref 0.9–1.1)
INR PPP: 2.7 (ref 0.9–1.1)
LYMPHOCYTES # BLD AUTO: 0.58 10^3/UL (ref 1–4)
LYMPHOCYTES NFR BLD AUTO: 5.1 % (ref 20–40)
MCH RBC QN AUTO: 30.2 PG (ref 27–31)
MCHC RBC AUTO-ENTMCNC: 32.7 G/DL (ref 32–36)
MCHC RBC AUTO-ENTMCNC: 92.5 FL (ref 82–92)
MONOCYTES # BLD AUTO: 1.06 10^3/UL (ref 0.1–0.8)
MONOCYTES NFR BLD AUTO: 9.3 % (ref 2–8)
NEUTROPHILS # BLD AUTO: 9.56 10^3/UL (ref 2.5–7)
NEUTROPHILS NFR BLD AUTO: 84.4 % (ref 50–70)
PLATELET # BLD AUTO: 158 10^3/UL (ref 150–400)
PMV BLD AUTO: 10.5 FL (ref 7.4–10.4)
POTASSIUM SERPL-SCNC: 4.4 MMOL/L (ref 3.5–5.1)
POTASSIUM SERPL-SCNC: 4.5 MMOL/L (ref 3.5–5.1)
PROT SERPL-MCNC: 6.1 G/DL (ref 6.4–8.2)
PROTHROMBIN TIME: 22.8 SEC (ref 9.1–12)
PROTHROMBIN TIME: 26.1 SEC (ref 9.1–12)
RBC # BLD AUTO: 4.96 10^6/UL (ref 4.5–6)
SODIUM SERPL-SCNC: 145 MMOL/L (ref 136–145)
SODIUM SERPL-SCNC: 147 MMOL/L (ref 136–145)
WBC # BLD AUTO: 11.34 10^3/UL (ref 5–10)

## 2025-03-12 RX ADMIN — LIDOCAINE HYDROCHLORIDE AND EPINEPHRINE ONE ML: 20; 10 INJECTION, SOLUTION INFILTRATION; PERINEURAL at 00:10

## 2025-03-12 RX ADMIN — LIDOCAINE-EPINEPHRINE-TETRACAINE GEL 4-0.05-0.5% ONE ML: 4-0.05-0.5 GEL at 00:10

## 2025-03-12 RX ADMIN — FLUTICASONE PROPIONATE SCH SPRAY: 50 SPRAY, METERED NASAL at 15:59

## 2025-03-12 RX ADMIN — POTASSIUM CHLORIDE SCH MEQ: 750 TABLET, FILM COATED, EXTENDED RELEASE ORAL at 09:21

## 2025-03-12 RX ADMIN — METOROPROLOL TARTRATE ONE: 5 INJECTION, SOLUTION INTRAVENOUS at 02:37

## 2025-03-13 VITALS — DIASTOLIC BLOOD PRESSURE: 82 MMHG | SYSTOLIC BLOOD PRESSURE: 113 MMHG | HEART RATE: 71 BPM

## 2025-03-13 LAB
ANION GAP SERPL CALC-SCNC: 8.9 MMOL/L (ref 5–15)
BASOPHILS # BLD AUTO: 0.02 10^3/UL (ref 0–0.1)
BASOPHILS NFR BLD AUTO: 0.2 % (ref 0–1)
BUN SERPL-MCNC: 17 MG/DL (ref 7–18)
CALCIUM SERPL-MCNC: 8.8 MG/DL (ref 8.7–10.3)
CHLORIDE SERPL-SCNC: 106 MMOL/L (ref 98–107)
CO2 SERPL-SCNC: 32.6 MMOL/L (ref 21–32)
CREAT CL 24H UR+SERPL-VRATE: 47.95 ML/MIN
CREAT SERPL-MCNC: 1.05 MG/DL (ref 0.51–1.17)
EGFRCR SERPLBLD CKD-EPI 2021: 69 ML/MIN (ref 60–?)
EOSINOPHIL # BLD AUTO: 0.09 10^3/UL (ref 0.1–0.3)
EOSINOPHIL NFR BLD AUTO: 1 % (ref 1–3)
ERYTHROCYTE [DISTWIDTH] IN BLOOD BY AUTOMATED COUNT: 15.2 % (ref 11.5–14.5)
GLUCOSE SERPL-MCNC: 92 MG/DL (ref 70–140)
HCT VFR BLD AUTO: 44.4 % (ref 40–52)
HGB BLD-MCNC: 14.7 G/DL (ref 13–17)
IMM GRANULOCYTES # BLD: 0.02 10^3/UL (ref 0–0.04)
IMM GRANULOCYTES NFR BLD: 0.2 % (ref 0–0.4)
INR PPP: 2 (ref 0.9–1.1)
LYMPHOCYTES # BLD AUTO: 0.6 10^3/UL (ref 1–4)
LYMPHOCYTES NFR BLD AUTO: 6.5 % (ref 20–40)
MCH RBC QN AUTO: 30.8 PG (ref 27–31)
MCHC RBC AUTO-ENTMCNC: 33.1 G/DL (ref 32–36)
MCHC RBC AUTO-ENTMCNC: 92.9 FL (ref 82–92)
MONOCYTES # BLD AUTO: 0.83 10^3/UL (ref 0.1–0.8)
MONOCYTES NFR BLD AUTO: 9 % (ref 2–8)
NEUTROPHILS # BLD AUTO: 7.68 10^3/UL (ref 2.5–7)
NEUTROPHILS NFR BLD AUTO: 83.1 % (ref 50–70)
PLATELET # BLD AUTO: 155 10^3/UL (ref 150–400)
PMV BLD AUTO: 11.1 FL (ref 7.4–10.4)
POTASSIUM SERPL-SCNC: 4.5 MMOL/L (ref 3.5–5.1)
PROTHROMBIN TIME: 19.8 SEC (ref 9.1–12)
RBC # BLD AUTO: 4.78 10^6/UL (ref 4.5–6)
SODIUM SERPL-SCNC: 143 MMOL/L (ref 136–145)
WBC # BLD AUTO: 9.24 10^3/UL (ref 5–10)